# Patient Record
Sex: FEMALE | Race: WHITE | NOT HISPANIC OR LATINO | Employment: FULL TIME | URBAN - METROPOLITAN AREA
[De-identification: names, ages, dates, MRNs, and addresses within clinical notes are randomized per-mention and may not be internally consistent; named-entity substitution may affect disease eponyms.]

---

## 2019-01-31 ENCOUNTER — HOSPITAL ENCOUNTER (OUTPATIENT)
Dept: INFUSION THERAPY | Facility: HOSPITAL | Age: 29
Discharge: HOME | End: 2019-01-31
Attending: OBSTETRICS & GYNECOLOGY

## 2019-01-31 LAB
ABO GROUP BLD: NORMAL
BLD GP AB SCN SERPL QL: NORMAL
CONV ABD CONTROL: NORMAL
RH BLD: NORMAL

## 2019-04-06 ENCOUNTER — HOSPITAL ENCOUNTER (OUTPATIENT)
Dept: LABOR AND DELIVERY | Facility: HOSPITAL | Age: 29
Discharge: HOME | End: 2019-04-06
Attending: OBSTETRICS & GYNECOLOGY

## 2019-12-26 ENCOUNTER — HOSPITAL ENCOUNTER (OUTPATIENT)
Dept: URGENT CARE | Facility: CLINIC | Age: 29
Discharge: HOME | End: 2019-12-26
Attending: NURSE PRACTITIONER

## 2019-12-28 LAB — BACTERIA SPEC AEROBE CULT: NORMAL

## 2021-05-21 ENCOUNTER — HOSPITAL ENCOUNTER (OUTPATIENT)
Dept: GENERAL RADIOLOGY | Facility: HOSPITAL | Age: 31
Discharge: HOME | End: 2021-05-21
Attending: OBSTETRICS & GYNECOLOGY

## 2022-04-20 PROCEDURE — U0004 COV-19 TEST NON-CDC HGH THRU: HCPCS | Performed by: FAMILY MEDICINE

## 2022-04-22 ENCOUNTER — TELEPHONE (OUTPATIENT)
Dept: URGENT CARE | Facility: CLINIC | Age: 32
End: 2022-04-22

## 2022-04-22 ENCOUNTER — E-VISIT (OUTPATIENT)
Dept: ADMINISTRATIVE | Facility: OTHER | Age: 32
End: 2022-04-22

## 2022-04-22 ENCOUNTER — TELEMEDICINE (OUTPATIENT)
Dept: FAMILY MEDICINE CLINIC | Facility: TELEHEALTH | Age: 32
End: 2022-04-22

## 2022-04-22 DIAGNOSIS — J06.9 VIRAL UPPER RESPIRATORY TRACT INFECTION: Primary | ICD-10-CM

## 2022-04-22 PROCEDURE — 99212 OFFICE O/P EST SF 10 MIN: CPT | Performed by: NURSE PRACTITIONER

## 2022-04-22 NOTE — PROGRESS NOTES
You have chosen to receive care through a telehealth visit.  Do you consent to use a video/audio connection for your medical care today? Yes     CHIEF COMPLAINT  Chief Complaint   Patient presents with   • GI Problem         HPI  Farideh Navarro is a 31 y.o. female  presents with complaint of nausea, vomiting and diarrhea. She started having symptoms on Monday about 2 hours [place] [employer]   She then developed cough, nasal congestion and lack of appetite and change in the way food taste. She was seen at Urgent care on 4/20 with negative COVID-19 and flu testing. She was exposed to the flu by her mother several days before becoming ill.   She has not picked up her Zofran, but will have someone to pick it up for her today     Review of Systems   Constitutional: Positive for chills, diaphoresis and fatigue. Negative for fever.   HENT: Positive for congestion, postnasal drip, rhinorrhea, sneezing and sore throat.    Respiratory: Positive for cough and shortness of breath (not out of the normal for her. ). Negative for chest tightness and wheezing.    Cardiovascular: Negative for chest pain.   Gastrointestinal: Positive for diarrhea (chronic due to IBS, but it is worse. ), nausea and vomiting.   Neurological: Positive for headaches.   Hematological: Negative for adenopathy.       No past medical history on file.    No family history on file.    Social History     Socioeconomic History   • Marital status:    Tobacco Use   • Smoking status: Former Smoker   • Smokeless tobacco: Never Used   Vaping Use   • Vaping Use: Every day   • Substances: Nicotine, Flavoring   • Devices: Disposable       Farideh Navarro  reports that she has quit smoking. She has never used smokeless tobacco. No tobacco use               LMP 04/15/2022 (Approximate)     PHYSICAL EXAM  Physical Exam   Constitutional: She is oriented to person, place, and time. She appears well-developed and well-nourished. She has a sickly  appearance. She does not appear ill. No distress.   HENT:   Head: Normocephalic and atraumatic.   Neck: Neck normal appearance.  Pulmonary/Chest: Effort normal.  No respiratory distress.  Neurological: She is alert and oriented to person, place, and time.   Skin: Skin is dry.   Psychiatric: She has a normal mood and affect.         Diagnoses and all orders for this visit:    1. Viral upper respiratory tract infection (Primary)    Flu negative, but she does have symptoms and exposure.   Take Zofran for nausea.   Try Robitussin DM, Mucinex DM or generic equivalent for cough.     The use of a video visit has been reviewed with the patient and verbal informd consent has een obtained. Myself and Farideh Navarro participated in this visit. The patient is located in [address] [place] [address]. I am located in [place], [place]. Mychart and Zoom were utilized. I spent 2 minutes in the patient's chart for this visit.           Marquita Dior, CECILIO  04/22/2022  12:07 EDT

## 2022-04-22 NOTE — TELEPHONE ENCOUNTER
----- Message from CECILIO Benavides sent at 4/22/2022  9:33 AM EDT -----  Please notify patient of negative COVID result

## 2022-04-22 NOTE — E-VISIT ESCALATED
Chief Complaint: Constipation   Patient was shown the following escalation message:   Some conditions need a visit with a healthcare provider   Because you've passed stool when you didn't mean to, you should speak with a provider to get care.   ----------   Patient Interview Transcript:   Which symptoms are you having? Select all that apply.    Abdominal pain or discomfort    Bloating    Swollen or distended abdomen   Not selected:    Fewer than 3 stools per week    Hard or lumpy stools    Stools that are too small    Straining with bowel movements    Feeling as though a blockage in my rectum is preventing a bowel movement    Feeling as though I can't completely empty my bowels   Which symptom is bothering you the most? Select one.    Abdominal pain or discomfort   Not selected:    Bloating    Swollen or distended abdomen    Fewer than 3 stools per week    Hard or lumpy stools    Stools that are too small    Straining with bowel movements    Feeling as though a blockage in my rectum is preventing a bowel movement    Feeling as though I can't completely empty my bowels   How long have you had your symptoms? Select one.    Less than 1 week   Not selected:    1 to 2 weeks    2 to 4 weeks    More than 1 month but less than 3 months    More than 3 months    I'm not sure   Are your symptoms constant, or do they come and go? For example, have you had normal stools in between the times you had hard stools? Are there times when you don't need to strain to have a bowel movement? Select one.    I'm not sure   Not selected:    Constant    Come and go   When was the last time you passed any stool at all? Select one.    Today   Not selected:    Yesterday    2 days ago    3 days ago    More than 3 days ago   On average, how many times a week do you have a bowel movement? Select one.    More than 1 time per day   Not selected:    Once a week    Twice a week    3 times per week    Every other day    Once a day   How often do you  feel the need or urge to have a bowel movement? Select one.    More than once a day   Not selected:    Less than 3 times per week    Every other day    Once a day   When you feel the need, how often do you immediately try to have a bowel movement? Select one.    Always   Not selected:    More than half the time    Less than half the time    Rarely or never   When you feel the need and then try to have a bowel movement, how often are you able to pass stool? Report passing any stool, even if it's a small amount. Select one.    Always   Not selected:    More than half the time    Less than half the time    Rarely or never   When you try to have a bowel movement, how often do you feel as if you've completely emptied your bowels? Select one.    More than half the time   Not selected:    Always    Less than half the time    Rarely or never   Do you ever need help having a bowel movement, such as using a finger to remove the stool from your rectum? Select one.    No   Not selected:    Yes   How would you rate the abdominal pain? Select one.    I'm not sure   Not selected:    Mild; I only notice it when I pay attention to it    Moderate; it's uncomfortable, but I can still do regular daily tasks    Severe; I can't get comfortable, and it stops me from doing regular daily tasks   Is the abdominal pain constant, or does it come and go? Select one.    Comes and goes   Not selected:    Constant    I'm not sure   Is the abdominal pain getting better, getting worse, or staying the same? Select one.    Same   Not selected:    Better    Worse   Do any of these describe your bloating or swollen abdomen? Select all that apply.    My belly feels skinner, tighter, and firmer than usual   Not selected:    My bloating and/or swollen abdomen symptoms have been getting worse    I feel as if I need to pass gas, but I can't    None of these   Think back to another time when you had similar symptoms. Does this current episode feel similar to  past episodes? Select one.    I've never had these symptoms before   Not selected:    Yes    No   Since your symptoms began, have you passed or leaked stool when you didn't mean to? Select one.    Yes   Not selected:    No   ----------   Medical history   Medical history data does not currently exist for this patient.

## 2022-04-22 NOTE — PATIENT INSTRUCTIONS
Drink plenty of water  Over the counter pain relievers okay   If symptoms do not improve in 3-5 days follow up with your primary care provider or urgent care  If symptoms worsen follow up with urgent care or the emergency room      Upper Respiratory Infection, Adult  An upper respiratory infection (URI) is a common viral infection of the nose, throat, and upper air passages that lead to the lungs. The most common type of URI is the common cold. URIs usually get better on their own, without medical treatment.  What are the causes?  A URI is caused by a virus. You may catch a virus by:  Breathing in droplets from an infected person's cough or sneeze.  Touching something that has been exposed to the virus (contaminated) and then touching your mouth, nose, or eyes.  What increases the risk?  You are more likely to get a URI if:  You are very young or very old.  It is frankie or winter.  You have close contact with others, such as at a , school, or health care facility.  You smoke.  You have long-term (chronic) heart or lung disease.  You have a weakened disease-fighting (immune) system.  You have nasal allergies or asthma.  You are experiencing a lot of stress.  You work in an area that has poor air circulation.  You have poor nutrition.  What are the signs or symptoms?  A URI usually involves some of the following symptoms:  Runny or stuffy (congested) nose.  Sneezing.  Cough.  Sore throat.  Headache.  Fatigue.  Fever.  Loss of appetite.  Pain in your forehead, behind your eyes, and over your cheekbones (sinus pain).  Muscle aches.  Redness or irritation of the eyes.  Pressure in the ears or face.  How is this diagnosed?  This condition may be diagnosed based on your medical history and symptoms, and a physical exam. Your health care provider may use a cotton swab to take a mucus sample from your nose (nasal swab). This sample can be tested to determine what virus is causing the illness.  How is this  treated?  URIs usually get better on their own within 7-10 days. You can take steps at home to relieve your symptoms. Medicines cannot cure URIs, but your health care provider may recommend certain medicines to help relieve symptoms, such as:  Over-the-counter cold medicines.  Cough suppressants. Coughing is a type of defense against infection that helps to clear the respiratory system, so take these medicines only as recommended by your health care provider.  Fever-reducing medicines.  Follow these instructions at home:  Activity  Rest as needed.  If you have a fever, stay home from work or school until your fever is gone or until your health care provider says you are no longer contagious. Your health care provider may have you wear a face mask to prevent your infection from spreading.  Relieving symptoms  Gargle with a salt-water mixture 3-4 times a day or as needed. To make a salt-water mixture, completely dissolve ½-1 tsp of salt in 1 cup of warm water.  Use a cool-mist humidifier to add moisture to the air. This can help you breathe more easily.  Eating and drinking    Drink enough fluid to keep your urine pale yellow.  Eat soups and other clear broths.    General instructions    Take over-the-counter and prescription medicines only as told by your health care provider. These include cold medicines, fever reducers, and cough suppressants.  Do not use any products that contain nicotine or tobacco, such as cigarettes and e-cigarettes. If you need help quitting, ask your health care provider.  Stay away from secondhand smoke.  Stay up to date on all immunizations, including the yearly (annual) flu vaccine.  Keep all follow-up visits as told by your health care provider. This is important.    How to prevent the spread of infection to others    URIs can be passed from person to person (are contagious). To prevent the infection from spreading:  Wash your hands often with soap and water. If soap and water are not  "available, use hand .  Avoid touching your mouth, face, eyes, or nose.  Cough or sneeze into a tissue or your sleeve or elbow instead of into your hand or into the air.    Contact a health care provider if:  You are getting worse instead of better.  You have a fever or chills.  Your mucus is brown or red.  You have yellow or brown discharge coming from your nose.  You have pain in your face, especially when you bend forward.  You have swollen neck glands.  You have pain while swallowing.  You have white areas in the back of your throat.  Get help right away if:  You have shortness of breath that gets worse.  You have severe or persistent:  Headache.  Ear pain.  Sinus pain.  Chest pain.  You have chronic lung disease along with any of the following:  Wheezing.  Prolonged cough.  Coughing up blood.  A change in your usual mucus.  You have a stiff neck.  You have changes in your:  Vision.  Hearing.  Thinking.  Mood.  Summary  An upper respiratory infection (URI) is a common infection of the nose, throat, and upper air passages that lead to the lungs.  A URI is caused by a virus.  URIs usually get better on their own within 7-10 days.  Medicines cannot cure URIs, but your health care provider may recommend certain medicines to help relieve symptoms.  This information is not intended to replace advice given to you by your health care provider. Make sure you discuss any questions you have with your health care provider.  Document Revised: 08/26/2021 Document Reviewed: 08/26/2021  [employer] Patient Education © 2021 [employer]    Influenza, Adult  Influenza, also called \"the flu,\" is a viral infection that mainly affects the respiratory tract. This includes the lungs, nose, and throat. The flu spreads easily from person to person (is contagious). It causes common cold symptoms, along with high fever and body aches.  What are the causes?  This condition is caused by the influenza virus. You can get the virus " by:  Breathing in droplets that are in the air from an infected person's cough or sneeze.  Touching something that has the virus on it (has been contaminated) and then touching your mouth, nose, or eyes.  What increases the risk?  The following factors may make you more likely to get the flu:  Not washing or sanitizing your hands often.  Having close contact with many people during cold and flu season.  Touching your mouth, eyes, or nose without first washing or sanitizing your hands.  Not getting an annual flu shot.  You may have a higher risk for the flu, including serious problems, such as a lung infection (pneumonia), if you:  Are older than 65.  Are pregnant.  Have a weakened disease-fighting system (immune system). This includes people who have HIV or AIDS, are on chemotherapy, or are taking medicines that reduce (suppress) the immune system.  Have a long-term (chronic) illness, such as heart disease, kidney disease, diabetes, or lung disease.  Have a liver disorder.  Are severely overweight (morbidly obese).  Have anemia.  Have asthma.  What are the signs or symptoms?  Symptoms of this condition usually begin suddenly and last 4-14 days. These may include:  Fever and chills.  Headaches, body aches, or muscle aches.  Sore throat.  Cough.  Runny or stuffy (congested) nose.  Chest discomfort.  Poor appetite.  Weakness or fatigue.  Dizziness.  Nausea or vomiting.  How is this diagnosed?  This condition may be diagnosed based on:  Your symptoms and medical history.  A physical exam.  Swabbing your nose or throat and testing the fluid for the influenza virus.  How is this treated?  If the flu is diagnosed early, you can be treated with antiviral medicine that is given by mouth (orally) or through an IV. This can help reduce how severe the illness is and how long it lasts.  Taking care of yourself at home can help relieve symptoms. Your health care provider may recommend:  Taking over-the-counter  medicines.  Drinking plenty of fluids.  In many cases, the flu goes away on its own. If you have severe symptoms or complications, you may be treated in a hospital.  Follow these instructions at home:  Activity  Rest as needed and get plenty of sleep.  Stay home from work or school as told by your health care provider. Unless you are visiting your health care provider, avoid leaving home until your fever has been gone for 24 hours without taking medicine.  Eating and drinking  Take an oral rehydration solution (ORS). This is a drink that is sold at pharmacies and retail stores.  Drink enough fluid to keep your urine pale yellow.  Drink clear fluids in small amounts as you are able. Clear fluids include water, ice chips, fruit juice mixed with water, and low-calorie sports drinks.  Eat bland, easy-to-digest foods in small amounts as you are able. These foods include bananas, applesauce, rice, lean meats, toast, and crackers.  Avoid drinking fluids that contain a lot of sugar or caffeine, such as energy drinks, regular sports drinks, and soda.  Avoid alcohol.  Avoid spicy or fatty foods.  General instructions         Take over-the-counter and prescription medicines only as told by your health care provider.  Use a cool mist humidifier to add humidity to the air in your home. This can make it easier to breathe.  When using a cool mist humidifier, clean it daily. Empty the water and replace it with clean water.  Cover your mouth and nose when you cough or sneeze.  Wash your hands with soap and water often and for at least 20 seconds, especially after you cough or sneeze. If soap and water are not available, use alcohol-based hand .  Keep all follow-up visits. This is important.  How is this prevented?    Get an annual flu shot. This is usually available in late summer, fall, or winter. Ask your health care provider when you should get your flu shot.  Avoid contact with people who are sick during cold and flu  "season. This is generally fall and winter.  Contact a health care provider if:  You develop new symptoms.  You have:  Chest pain.  Diarrhea.  A fever.  Your cough gets worse.  You produce more mucus.  You feel nauseous or you vomit.  Get help right away if you:  Develop shortness of breath or have difficulty breathing.  Have skin or nails that turn a bluish color.  Have severe pain or stiffness in your neck.  Develop a sudden headache or sudden pain in your face or ear.  Cannot eat or drink without vomiting.  These symptoms may represent a serious problem that is an emergency. Do not wait to see if the symptoms will go away. Get medical help right away. Call your local emergency services (911 in the [place] Do not drive yourself to the hospital.  Summary  Influenza, also called \"the flu,\" is a viral infection that primarily affects your respiratory tract.  Symptoms of the flu usually begin suddenly and last 4-14 days.  Getting an annual flu shot is the best way to prevent getting the flu.  Stay home from work or school as told by your health care provider. Unless you are visiting your health care provider, avoid leaving home until your fever has been gone for 24 hours without taking medicine.  Keep all follow-up visits. This is important.  This information is not intended to replace advice given to you by your health care provider. Make sure you discuss any questions you have with your health care provider.  Document Revised: 08/06/2021 Document Reviewed: 08/06/2021  [employer] Patient Education © 2021 [employer]            "

## 2022-08-19 PROCEDURE — U0004 COV-19 TEST NON-CDC HGH THRU: HCPCS | Performed by: NURSE PRACTITIONER

## 2022-10-21 ENCOUNTER — TELEPHONE (OUTPATIENT)
Dept: INTERNAL MEDICINE | Facility: CLINIC | Age: 32
End: 2022-10-21

## 2022-10-21 ENCOUNTER — OFFICE VISIT (OUTPATIENT)
Dept: INTERNAL MEDICINE | Facility: CLINIC | Age: 32
End: 2022-10-21

## 2022-10-21 VITALS
WEIGHT: 174.2 LBS | BODY MASS INDEX: 30.87 KG/M2 | DIASTOLIC BLOOD PRESSURE: 68 MMHG | HEIGHT: 63 IN | SYSTOLIC BLOOD PRESSURE: 101 MMHG | OXYGEN SATURATION: 96 % | HEART RATE: 80 BPM | TEMPERATURE: 99.6 F

## 2022-10-21 DIAGNOSIS — K58.0 IRRITABLE BOWEL SYNDROME WITH DIARRHEA: ICD-10-CM

## 2022-10-21 DIAGNOSIS — J30.9 ALLERGIC RHINITIS, UNSPECIFIED SEASONALITY, UNSPECIFIED TRIGGER: ICD-10-CM

## 2022-10-21 DIAGNOSIS — Z13.220 SCREENING FOR LIPID DISORDERS: ICD-10-CM

## 2022-10-21 DIAGNOSIS — Z00.00 ANNUAL PHYSICAL EXAM: ICD-10-CM

## 2022-10-21 DIAGNOSIS — Z83.79 FAMILY HISTORY OF CROHN'S DISEASE: ICD-10-CM

## 2022-10-21 DIAGNOSIS — Z00.00 ENCOUNTER FOR MEDICAL EXAMINATION TO ESTABLISH CARE: Primary | ICD-10-CM

## 2022-10-21 DIAGNOSIS — Z11.59 NEED FOR HEPATITIS C SCREENING TEST: ICD-10-CM

## 2022-10-21 PROCEDURE — 99395 PREV VISIT EST AGE 18-39: CPT | Performed by: NURSE PRACTITIONER

## 2022-10-21 PROCEDURE — 80050 GENERAL HEALTH PANEL: CPT | Performed by: NURSE PRACTITIONER

## 2022-10-21 PROCEDURE — 99213 OFFICE O/P EST LOW 20 MIN: CPT | Performed by: NURSE PRACTITIONER

## 2022-10-21 PROCEDURE — 86803 HEPATITIS C AB TEST: CPT | Performed by: NURSE PRACTITIONER

## 2022-10-21 PROCEDURE — 80061 LIPID PANEL: CPT | Performed by: NURSE PRACTITIONER

## 2022-10-21 RX ORDER — CETIRIZINE HYDROCHLORIDE 10 MG/1
10 TABLET ORAL DAILY
Qty: 90 TABLET | Refills: 1 | Status: SHIPPED | OUTPATIENT
Start: 2022-10-21

## 2022-10-21 NOTE — TELEPHONE ENCOUNTER
1ST 21 OCT 2022 FAXED MEDICAL RECORDS REQUEST TO [facility] [phone]  ----- Message from CECILIO Tirado sent at 10/21/2022  2:13 PM EDT -----  Last PAP from [facility]

## 2022-10-21 NOTE — PROGRESS NOTES
Chief Complaint  Establish Care, Eye Problem (Left eye swelling after having covid. Clear liquid coming out of eye.), and Abdominal Cramping (Woke this morning with some strong cramp, hurts more while sitting still for long period of times. )    Subjective          Farideh Navarro presents to [facility]  Diarrhea   This is a recurrent problem. The current episode started more than 1 year ago. The problem has been unchanged. The patient states that diarrhea does not awaken her from sleep. Associated symptoms include abdominal pain (intermittent cramping ). Pertinent negatives include no arthralgias, bloating, chills, coughing, fever, headaches, increased  flatus, myalgias, sweats, URI, vomiting or weight loss. Risk factors: Family hx of Crohns  She has tried change of diet for the symptoms. The treatment provided no relief. was supposed to have colonoscopy at [facility]    Nicotine Dependence  Presents for initial visit. Symptoms include cravings. Symptoms are negative for decreased concentration, fatigue, headache, insomnia, irritability and sore throat. Preferred tobacco types include cigarettes. She smokes < 1/2 a pack of cigarettes per day. She started smoking when she was <11 years old.       Previous PCP:  Never had one  Specialist(s): [facility] chiropractor   COVID vaccine:completed other than booster  Pneumonia vaccine: no  Shingles vaccine: no  Colon cancer screening:   Mammogram: Earlier this year.  Pap Smear: about 6 months ago.     Scoliosis - sees chiropractor.   [facility].     Used to binge drink.   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   Daily:   Smoked weed daily    Drinks a lot of caffiene.   Smokes <1/2 ppd.   vapes at night   Does not wan to quit.       Current Outpatient Medications   Medication Instructions   • cetirizine (ZYRTEC) 10 mg, Oral, Daily       The following portions of the patient's history were reviewed and updated as appropriate:  "allergies, current medications, past family history, past medical history, past social history, past surgical history, and problem list.    Objective   Vital Signs:   /68   Pulse 80   Temp 99.6 °F (37.6 °C) (Temporal)   Ht 160 cm (63\")   Wt 79 kg (174 lb 3.2 oz)   SpO2 96%   BMI 30.86 kg/m²     Wt Readings from Last 3 Encounters:   10/21/22 79 kg (174 lb 3.2 oz)   08/19/22 78 kg (172 lb)   08/01/22 80.3 kg (177 lb)     BP Readings from Last 3 Encounters:   10/21/22 101/68   08/19/22 110/77   08/01/22 108/75     Physical Exam   Appearance: No acute distress, well-nourished  Head: normocephalic, atraumatic  Eyes: extraocular movements intact, no scleral icterus, no conjunctival injection  Ears, Nose, and Throat: external ears normal, nares patent, moist mucous membranes  Cardiovascular: regular rate and rhythm. no murmurs, rubs, or gallops. no edema  Respiratory: breathing comfortably, symmetric chest rise, clear to auscultation bilaterally. No wheezes, rales, or rhonchi.  Neuro: alert and oriented to time, place, and person. Normal gait  Psych: normal mood and affect     Result Review :   The following data was reviewed by: CECILIO Tirado on 10/21/2022:  Common labs    Common Labs 10/21/22 10/21/22 10/21/22    1614 1614 1614   Glucose   86   BUN   12   Creatinine   0.74   Sodium   138   Potassium   4.2   Chloride   103   Calcium   9.6   Albumin   4.30   Total Bilirubin   0.3   Alkaline Phosphatase   74   AST (SGOT)   14   ALT (SGPT)   18   WBC 5.96     Hemoglobin 13.2     Hematocrit 40.1     Platelets 276     Total Cholesterol  150    Triglycerides  120    HDL Cholesterol  31 (A)    LDL Cholesterol   97    (A) Abnormal value                   Lab Results   Component Value Date    SARSANTIGEN Not Detected 08/19/2022    COVID19 Not Detected 08/19/2022    RAPFLUA Negative 08/19/2022    RAPFLUB Negative 08/19/2022    RAPSCRN Negative 08/19/2022       Procedures        Assessment and Plan  "   Diagnoses and all orders for this visit:    1. Encounter for medical examination to establish care (Primary)    2. Family history of Crohn's disease  -     Ambulatory Referral to Gastroenterology    3. Irritable bowel syndrome with diarrhea  -     Ambulatory Referral to Gastroenterology  -     TSH Rfx On Abnormal To Free T4  -     Comprehensive Metabolic Panel  -     CBC & Differential    4. Allergic rhinitis, unspecified seasonality, unspecified trigger  -     cetirizine (zyrTEC) 10 MG tablet; Take 1 tablet by mouth Daily.  Dispense: 90 tablet; Refill: 1    5. Annual physical exam    6. Need for hepatitis C screening test  -     HCV Antibody Rfx To Qnt PCR  -     Interpretation:    7. Screening for lipid disorders  -     Lipid Panel        Advised on diet, physical activity, sunscreen, helmet, texting and driving, etc    There are no discontinued medications.       Follow Up   Return in about 1 year (around 10/21/2023) for Annual physical.  Patient was given instructions and counseling regarding her condition or for health maintenance advice. Please see specific information pulled into the AVS if appropriate.       Vianca Mercado, CECILIO  10/25/22  08:40 EDT

## 2022-10-22 LAB
ALBUMIN SERPL-MCNC: 4.3 G/DL (ref 3.5–5.2)
ALBUMIN/GLOB SERPL: 1.7 G/DL
ALP SERPL-CCNC: 74 U/L (ref 39–117)
ALT SERPL W P-5'-P-CCNC: 18 U/L (ref 1–33)
ANION GAP SERPL CALCULATED.3IONS-SCNC: 10.1 MMOL/L (ref 5–15)
AST SERPL-CCNC: 14 U/L (ref 1–32)
BASOPHILS # BLD AUTO: 0.03 10*3/MM3 (ref 0–0.2)
BASOPHILS NFR BLD AUTO: 0.5 % (ref 0–1.5)
BILIRUB SERPL-MCNC: 0.3 MG/DL (ref 0–1.2)
BUN SERPL-MCNC: 12 MG/DL (ref 6–20)
BUN/CREAT SERPL: 16.2 (ref 7–25)
CALCIUM SPEC-SCNC: 9.6 MG/DL (ref 8.6–10.5)
CHLORIDE SERPL-SCNC: 103 MMOL/L (ref 98–107)
CHOLEST SERPL-MCNC: 150 MG/DL (ref 0–200)
CO2 SERPL-SCNC: 24.9 MMOL/L (ref 22–29)
CREAT SERPL-MCNC: 0.74 MG/DL (ref 0.57–1)
DEPRECATED RDW RBC AUTO: 40.5 FL (ref 37–54)
EGFRCR SERPLBLD CKD-EPI 2021: 110.4 ML/MIN/1.73
EOSINOPHIL # BLD AUTO: 0.13 10*3/MM3 (ref 0–0.4)
EOSINOPHIL NFR BLD AUTO: 2.2 % (ref 0.3–6.2)
ERYTHROCYTE [DISTWIDTH] IN BLOOD BY AUTOMATED COUNT: 11.9 % (ref 12.3–15.4)
GLOBULIN UR ELPH-MCNC: 2.5 GM/DL
GLUCOSE SERPL-MCNC: 86 MG/DL (ref 65–99)
HCT VFR BLD AUTO: 40.1 % (ref 34–46.6)
HDLC SERPL-MCNC: 31 MG/DL (ref 40–60)
HGB BLD-MCNC: 13.2 G/DL (ref 12–15.9)
IMM GRANULOCYTES # BLD AUTO: 0.01 10*3/MM3 (ref 0–0.05)
IMM GRANULOCYTES NFR BLD AUTO: 0.2 % (ref 0–0.5)
LDLC SERPL CALC-MCNC: 97 MG/DL (ref 0–100)
LDLC/HDLC SERPL: 3.06 {RATIO}
LYMPHOCYTES # BLD AUTO: 2.25 10*3/MM3 (ref 0.7–3.1)
LYMPHOCYTES NFR BLD AUTO: 37.8 % (ref 19.6–45.3)
MCH RBC QN AUTO: 30.4 PG (ref 26.6–33)
MCHC RBC AUTO-ENTMCNC: 32.9 G/DL (ref 31.5–35.7)
MCV RBC AUTO: 92.4 FL (ref 79–97)
MONOCYTES # BLD AUTO: 0.43 10*3/MM3 (ref 0.1–0.9)
MONOCYTES NFR BLD AUTO: 7.2 % (ref 5–12)
NEUTROPHILS NFR BLD AUTO: 3.11 10*3/MM3 (ref 1.7–7)
NEUTROPHILS NFR BLD AUTO: 52.1 % (ref 42.7–76)
NRBC BLD AUTO-RTO: 0 /100 WBC (ref 0–0.2)
PLATELET # BLD AUTO: 276 10*3/MM3 (ref 140–450)
PMV BLD AUTO: 12.2 FL (ref 6–12)
POTASSIUM SERPL-SCNC: 4.2 MMOL/L (ref 3.5–5.2)
PROT SERPL-MCNC: 6.8 G/DL (ref 6–8.5)
RBC # BLD AUTO: 4.34 10*6/MM3 (ref 3.77–5.28)
SODIUM SERPL-SCNC: 138 MMOL/L (ref 136–145)
TRIGL SERPL-MCNC: 120 MG/DL (ref 0–150)
TSH SERPL DL<=0.05 MIU/L-ACNC: 1.85 UIU/ML (ref 0.27–4.2)
VLDLC SERPL-MCNC: 22 MG/DL (ref 5–40)
WBC NRBC COR # BLD: 5.96 10*3/MM3 (ref 3.4–10.8)

## 2022-10-23 LAB
HCV AB S/CO SERPL IA: <0.1 S/CO RATIO (ref 0–0.9)
HCV AB SERPL QL IA: NORMAL

## 2023-08-30 NOTE — PROGRESS NOTES
Chief Complaint        IBS w/diarrhea    History of Present Illness      Farideh Navarro is a 33 y.o. female who presents to [facility] as a new patient with a history of generalized abdominal pain, altered bowel habits, diarrhea, IBS with diarrhea, family history of Crohn's disease and nausea.  Patient reports that she has had abdominal symptoms since being a teenager.  She has intermittent abdominal pain that begins with cramping in the central abdomen and then radiates to the lower abdomen.  Patient reports bowel movements that range from anywhere from 3-10 times per day that are watery and loose in texture.  She denies any melena or hematochezia.  Patient reports that she has been trying to eat very clean which causes more frequent bowel movements but less pain.  She does have a family history of Crohn's disease and a paternal grandfather.  She reports very urgent bowel movements.  Patient reports nausea that is intermittent.  She denies vomiting.  Patient reports skipped meals worsen her symptoms as well as after meals seems to cause worsening symptoms.  Dairy and gluten seem to be triggers.  Patient denies fever,  vomiting, weight loss, night sweats, melena, hematochezia, hematemesis.    No colon or EGD on file for this patient      Results       Result Review :   The following data was reviewed by: CECILIO Dowling on 09/01/2023     CMP          10/21/2022    16:14   CMP   Glucose 86    BUN 12    Creatinine 0.74    EGFR 110.4    Sodium 138    Potassium 4.2    Chloride 103    Calcium 9.6    Total Protein 6.8    Albumin 4.30    Globulin 2.5    Total Bilirubin 0.3    Alkaline Phosphatase 74    AST (SGOT) 14    ALT (SGPT) 18    Albumin/Globulin Ratio 1.7    BUN/Creatinine Ratio 16.2    Anion Gap 10.1      CBC          10/21/2022    16:14   CBC   WBC 5.96    RBC 4.34    Hemoglobin 13.2    Hematocrit 40.1    MCV 92.4    MCH 30.4    MCHC 32.9    RDW 11.9    Platelets 276   "      Iron Profile No results found for: IRON, TIBC, LABIRON, TRANSFERRIN  Ferritin No results found for: FERRITIN         Past Medical History       Past Medical History:   Diagnosis Date    Gestational diabetes        Past Surgical History:   Procedure Laterality Date    APPENDECTOMY       SECTION      TUBAL ABDOMINAL LIGATION           Current Outpatient Medications:     dicyclomine (BENTYL) 20 MG tablet, Take 1 tablet by mouth Every 6 (Six) Hours., Disp: 90 tablet, Rfl: 3    Sod Picosulfate-Mag Ox-Cit Acd (Clenpiq) 10-3.5-12 MG-GM -GM/160ML solution, Take 175 mL by mouth 1 (One) Time for 1 dose. As directed by office., Disp: 350 mL, Rfl: 0     Allergies   Allergen Reactions    Aloe Hives    Lamotrigine Hives       Family History   Problem Relation Age of Onset    Hypertension Mother     Fibromyalgia Mother     Anxiety disorder Mother     Diabetes Father     Deep vein thrombosis Father     Kidney failure Father     Hypertension Maternal Grandmother     Skin cancer Maternal Grandmother     Hypertension Maternal Grandfather     Diabetes Maternal Grandfather     Crohn's disease Maternal Grandfather     COPD Paternal Grandmother     Heart attack Paternal Grandmother     Fibromyalgia Paternal Grandmother     Colon cancer Neg Hx         Social History     Social History Narrative    Not on file       Objective       Objective     Vital Signs:   BP 98/65 (BP Location: Left arm, Patient Position: Sitting, Cuff Size: Adult)   Pulse 80   Ht 160 cm (63\")   Wt 81.6 kg (179 lb 12.8 oz)   SpO2 97%   BMI 31.85 kg/mý     Body mass index is 31.85 kg/mý.    Physical Exam  Constitutional:       General: She is not in acute distress.     Appearance: Normal appearance. She is well-developed and normal weight.   Eyes:      Conjunctiva/sclera: Conjunctivae normal.      Pupils: Pupils are equal, round, and reactive to light.      Visual Fields: Right eye visual fields normal and left eye visual fields normal. "   Cardiovascular:      Rate and Rhythm: Normal rate and regular rhythm.      Heart sounds: Normal heart sounds.   Pulmonary:      Effort: Pulmonary effort is normal. No retractions.      Breath sounds: Normal breath sounds and air entry.      Comments: Inspection of chest: normal appearance  Abdominal:      General: Bowel sounds are normal.      Palpations: Abdomen is soft.      Tenderness: There is no abdominal tenderness in the right lower quadrant, suprapubic area and left lower quadrant.      Comments: No appreciable hepatosplenomegaly   Musculoskeletal:      Cervical back: Neck supple.      Right lower leg: No edema.      Left lower leg: No edema.   Lymphadenopathy:      Cervical: No cervical adenopathy.   Skin:     Findings: No lesion.      Comments: Turgor normal   Neurological:      Mental Status: She is alert and oriented to person, place, and time.   Psychiatric:         Mood and Affect: Mood and affect normal.            Assessment & Plan          Assessment and Plan    Diagnoses and all orders for this visit:    1. Nausea (Primary)  -     Case Request; Standing  -     Follow Anesthesia Guidelines / Protocol; Standing  -     Obtain Informed Consent; Standing  -     Verify NPO; Standing  -     Verify Bowel Prep Was Successful; Standing  -     Give Tap Water Enema If Bowel Prep Insufficient; Standing  -     Case Request    2. Family history of Crohn's disease  -     Case Request; Standing  -     Follow Anesthesia Guidelines / Protocol; Standing  -     Obtain Informed Consent; Standing  -     Verify NPO; Standing  -     Verify Bowel Prep Was Successful; Standing  -     Give Tap Water Enema If Bowel Prep Insufficient; Standing  -     Case Request    3. Generalized abdominal pain  -     Case Request; Standing  -     Follow Anesthesia Guidelines / Protocol; Standing  -     Obtain Informed Consent; Standing  -     Verify NPO; Standing  -     Verify Bowel Prep Was Successful; Standing  -     Give Tap Water Enema  If Bowel Prep Insufficient; Standing  -     Case Request    4. Altered bowel habits  -     Case Request; Standing  -     Follow Anesthesia Guidelines / Protocol; Standing  -     Obtain Informed Consent; Standing  -     Verify NPO; Standing  -     Verify Bowel Prep Was Successful; Standing  -     Give Tap Water Enema If Bowel Prep Insufficient; Standing  -     Case Request    5. Diarrhea, unspecified type  -     Case Request; Standing  -     Follow Anesthesia Guidelines / Protocol; Standing  -     Obtain Informed Consent; Standing  -     Verify NPO; Standing  -     Verify Bowel Prep Was Successful; Standing  -     Give Tap Water Enema If Bowel Prep Insufficient; Standing  -     Case Request    6. Irritable bowel syndrome with diarrhea  -     Case Request; Standing  -     Follow Anesthesia Guidelines / Protocol; Standing  -     Obtain Informed Consent; Standing  -     Verify NPO; Standing  -     Verify Bowel Prep Was Successful; Standing  -     Give Tap Water Enema If Bowel Prep Insufficient; Standing  -     Case Request    Other orders  -     dicyclomine (BENTYL) 20 MG tablet; Take 1 tablet by mouth Every 6 (Six) Hours.  Dispense: 90 tablet; Refill: 3  -     Sod Picosulfate-Mag Ox-Cit Acd (Clenpiq) 10-3.5-12 MG-GM -GM/160ML solution; Take 175 mL by mouth 1 (One) Time for 1 dose. As directed by office.  Dispense: 350 mL; Refill: 0      33-year-old female presenting the office today as a new patient with a history of generalized abdominal pain, altered bowel habits, diarrhea, IBS with diarrhea, family history of Crohn's disease and nausea.  Plan:  I have recommended that the patient undergo further evaluation with a colonoscopy.  I have discussed this procedure in detail with the patient.  I have discussed the risks, benefits and alternatives.  I have discussed the risk of anesthesia, bleeding and perforation.  Patient understands these risks, benefits and alternatives and wishes to proceed.  I will schedule her at her  earliest convenience.  I have prescribed Bentyl to be used as needed for abdominal cramps and spasms.  Patient will follow-up in office after endoscopy.  Patient agreeable to this plan and will call with any questions or concerns.            Follow Up       Follow Up   Return for Follow up after endoscopy in office.  Patient was given instructions and counseling regarding her condition or for health maintenance advice. Please see specific information pulled into the AVS if appropriate.

## 2023-09-01 ENCOUNTER — OFFICE VISIT (OUTPATIENT)
Dept: GASTROENTEROLOGY | Facility: CLINIC | Age: 33
End: 2023-09-01
Payer: COMMERCIAL

## 2023-09-01 VITALS
DIASTOLIC BLOOD PRESSURE: 65 MMHG | WEIGHT: 179.8 LBS | HEART RATE: 80 BPM | BODY MASS INDEX: 31.86 KG/M2 | SYSTOLIC BLOOD PRESSURE: 98 MMHG | HEIGHT: 63 IN | OXYGEN SATURATION: 97 %

## 2023-09-01 DIAGNOSIS — R11.0 NAUSEA: Primary | ICD-10-CM

## 2023-09-01 DIAGNOSIS — R19.4 ALTERED BOWEL HABITS: ICD-10-CM

## 2023-09-01 DIAGNOSIS — K58.0 IRRITABLE BOWEL SYNDROME WITH DIARRHEA: ICD-10-CM

## 2023-09-01 DIAGNOSIS — R10.84 GENERALIZED ABDOMINAL PAIN: ICD-10-CM

## 2023-09-01 DIAGNOSIS — Z83.79 FAMILY HISTORY OF CROHN'S DISEASE: ICD-10-CM

## 2023-09-01 DIAGNOSIS — R19.7 DIARRHEA, UNSPECIFIED TYPE: ICD-10-CM

## 2023-09-01 RX ORDER — SODIUM PICOSULFATE, MAGNESIUM OXIDE, AND ANHYDROUS CITRIC ACID 10; 3.5; 12 MG/160ML; G/160ML; G/160ML
175 LIQUID ORAL ONCE
Qty: 350 ML | Refills: 0 | Status: SHIPPED | OUTPATIENT
Start: 2023-09-01 | End: 2023-09-01

## 2023-09-01 RX ORDER — DICYCLOMINE HCL 20 MG
20 TABLET ORAL EVERY 6 HOURS
Qty: 90 TABLET | Refills: 3 | Status: SHIPPED | OUTPATIENT
Start: 2023-09-01

## 2023-09-05 ENCOUNTER — PATIENT ROUNDING (BHMG ONLY) (OUTPATIENT)
Dept: GASTROENTEROLOGY | Facility: CLINIC | Age: 33
End: 2023-09-05
Payer: COMMERCIAL

## 2023-09-05 NOTE — PROGRESS NOTES
9/5/2023      Hello, may I speak with Farideh Navarro     My name is Uday. I am calling from [facility] [place]. I show that you had a recent visit with CECILIO Dowling.    Before we get started may I verify your date of birth? 1990    I am calling to officially welcome you to our practice and ask about your recent visit. Is this a good time to talk? No. I left patient a voicemail, OK per SHERRIE.     Tell me about your visit with us. What things went well?    We strive to ensure that we protect your safety and privacy. Is there anything we could have done to improve this during your visit?        We're always looking for ways to make our patients' experiences even better. Do you have recommendations on ways we may improve?    Overall were you satisfied with your first visit to our practice?    I appreciate you taking the time to speak with me today. Is there anything else I can do for you?    I am glad to hear that you had a very good visit and I appreciate you taking the time to provide feedback on this call. We would greatly appreciate you filling out a survey if you receive one in the mail, email or text. This is a great opportunity to provide any additional feedback that you may think of after this call as well.       Thank you, and have a great day.

## 2023-09-29 NOTE — PRE-PROCEDURE INSTRUCTIONS
"Instructed on date and arrival time of 0700. Come to entrance \"C\". Must have  over age 18 to drive home.  May have two visitors; however, children under 12 must stay in waiting room.  Discussed clear liquid diet (no red or purple) and bowel prep.  May take medications as usual except for blood thinners, diabetic medications, and weight loss medications.  Bring list of medications.  Verbalized understanding of instructions given.  Instructed to call for questions or concerns.  "

## 2023-10-05 ENCOUNTER — ANESTHESIA EVENT (OUTPATIENT)
Dept: GASTROENTEROLOGY | Facility: HOSPITAL | Age: 33
End: 2023-10-05
Payer: COMMERCIAL

## 2023-10-05 NOTE — ANESTHESIA PREPROCEDURE EVALUATION
Anesthesia Evaluation     NPO Solid Status: > 8 hours  NPO Liquid Status: > 2 hours           Airway   Mallampati: II  TM distance: >3 FB  Neck ROM: full  No difficulty expected  Dental    (+) poor dentition        Pulmonary - normal exam   (+) a smoker (vapes) Current Smoked day of surgery,  Cardiovascular - normal exam        Neuro/Psych  GI/Hepatic/Renal/Endo    (+) obesity, diabetes mellitus gestational    Musculoskeletal (-) negative ROS    Abdominal   (+) obese   Substance History      OB/GYN negative ob/gyn ROS         Other - negative ROS                     Anesthesia Plan    ASA 1     general     (Total IV Anesthesia    Patient understands anesthesia not responsible for dental damage.  )  intravenous induction     Anesthetic plan, risks, benefits, and alternatives have been provided, discussed and informed consent has been obtained with: patient.    Plan discussed with CRNA.    CODE STATUS:

## 2023-10-06 ENCOUNTER — HOSPITAL ENCOUNTER (OUTPATIENT)
Facility: HOSPITAL | Age: 33
Setting detail: HOSPITAL OUTPATIENT SURGERY
Discharge: HOME | End: 2023-10-06
Attending: INTERNAL MEDICINE | Admitting: INTERNAL MEDICINE
Payer: COMMERCIAL

## 2023-10-06 ENCOUNTER — ANESTHESIA (OUTPATIENT)
Dept: GASTROENTEROLOGY | Facility: HOSPITAL | Age: 33
End: 2023-10-06
Payer: COMMERCIAL

## 2023-10-06 VITALS
SYSTOLIC BLOOD PRESSURE: 88 MMHG | BODY MASS INDEX: 31.91 KG/M2 | RESPIRATION RATE: 15 BRPM | WEIGHT: 180.12 LBS | DIASTOLIC BLOOD PRESSURE: 69 MMHG | HEART RATE: 61 BPM | TEMPERATURE: 98.7 F | OXYGEN SATURATION: 100 %

## 2023-10-06 DIAGNOSIS — R19.7 DIARRHEA, UNSPECIFIED TYPE: ICD-10-CM

## 2023-10-06 DIAGNOSIS — Z83.79 FAMILY HISTORY OF CROHN'S DISEASE: ICD-10-CM

## 2023-10-06 DIAGNOSIS — R11.0 NAUSEA: ICD-10-CM

## 2023-10-06 DIAGNOSIS — K58.0 IRRITABLE BOWEL SYNDROME WITH DIARRHEA: ICD-10-CM

## 2023-10-06 DIAGNOSIS — R10.84 GENERALIZED ABDOMINAL PAIN: ICD-10-CM

## 2023-10-06 DIAGNOSIS — R19.4 ALTERED BOWEL HABITS: ICD-10-CM

## 2023-10-06 PROCEDURE — 88305 TISSUE EXAM BY PATHOLOGIST: CPT | Performed by: INTERNAL MEDICINE

## 2023-10-06 PROCEDURE — 25810000003 LACTATED RINGERS PER 1000 ML: Performed by: NURSE ANESTHETIST, CERTIFIED REGISTERED

## 2023-10-06 PROCEDURE — 45385 COLONOSCOPY W/LESION REMOVAL: CPT | Performed by: INTERNAL MEDICINE

## 2023-10-06 PROCEDURE — 45380 COLONOSCOPY AND BIOPSY: CPT | Performed by: INTERNAL MEDICINE

## 2023-10-06 PROCEDURE — 25010000002 PROPOFOL 10 MG/ML EMULSION: Performed by: NURSE ANESTHETIST, CERTIFIED REGISTERED

## 2023-10-06 RX ORDER — PROPOFOL 10 MG/ML
VIAL (ML) INTRAVENOUS AS NEEDED
Status: DISCONTINUED | OUTPATIENT
Start: 2023-10-06 | End: 2023-10-06 | Stop reason: SURG

## 2023-10-06 RX ORDER — SODIUM CHLORIDE, SODIUM LACTATE, POTASSIUM CHLORIDE, CALCIUM CHLORIDE 600; 310; 30; 20 MG/100ML; MG/100ML; MG/100ML; MG/100ML
30 INJECTION, SOLUTION INTRAVENOUS CONTINUOUS
Status: DISCONTINUED | OUTPATIENT
Start: 2023-10-06 | End: 2023-10-06 | Stop reason: HOSPADM

## 2023-10-06 RX ORDER — LIDOCAINE HYDROCHLORIDE 20 MG/ML
INJECTION, SOLUTION EPIDURAL; INFILTRATION; INTRACAUDAL; PERINEURAL AS NEEDED
Status: DISCONTINUED | OUTPATIENT
Start: 2023-10-06 | End: 2023-10-06 | Stop reason: SURG

## 2023-10-06 RX ADMIN — SODIUM CHLORIDE, POTASSIUM CHLORIDE, SODIUM LACTATE AND CALCIUM CHLORIDE 30 ML/HR: 600; 310; 30; 20 INJECTION, SOLUTION INTRAVENOUS at 07:27

## 2023-10-06 RX ADMIN — PROPOFOL 100 MG: 10 INJECTION, EMULSION INTRAVENOUS at 08:10

## 2023-10-06 RX ADMIN — PROPOFOL 100 MG: 10 INJECTION, EMULSION INTRAVENOUS at 08:20

## 2023-10-06 RX ADMIN — PROPOFOL 200 MCG/KG/MIN: 10 INJECTION, EMULSION INTRAVENOUS at 08:10

## 2023-10-06 RX ADMIN — LIDOCAINE HYDROCHLORIDE 50 MG: 20 INJECTION, SOLUTION EPIDURAL; INFILTRATION; INTRACAUDAL; PERINEURAL at 08:10

## 2023-10-06 NOTE — ANESTHESIA POSTPROCEDURE EVALUATION
Patient: Farideh Navarro    Procedure Summary       Date: 10/06/23 Room / Location: [facility] 2 / [facility]    Anesthesia Start: 0809 Anesthesia Stop: 0839    Procedure: COLONOSCOPY WITH BIOPSIES/POLYPECTOMY/SNARE Diagnosis:       Nausea      Family history of Crohn's disease      Generalized abdominal pain      Altered bowel habits      Diarrhea, unspecified type      Irritable bowel syndrome with diarrhea      (Nausea [R11.0])      (Family history of Crohn's disease [Z83.79])      (Generalized abdominal pain [R10.84])      (Altered bowel habits [R19.4])      (Diarrhea, unspecified type [R19.7])      (Irritable bowel syndrome with diarrhea [K58.0])    Surgeons: Connor Borjas MD Provider: Radha Burns CRNA    Anesthesia Type: general ASA Status: 1            Anesthesia Type: general    Vitals  Vitals Value Taken Time   BP 88/69 10/06/23 0850   Temp 37.1 °C (98.7 °F) 10/06/23 0850   Pulse 80 10/06/23 0851   Resp 15 10/06/23 0850   SpO2 100 % 10/06/23 0851   Vitals shown include unvalidated device data.        Post Anesthesia Care and Evaluation    Patient location during evaluation: bedside  Patient participation: complete - patient participated  Level of consciousness: awake  Pain management: adequate    Airway patency: patent  Anesthetic complications: No anesthetic complications  PONV Status: controlled  Cardiovascular status: acceptable and stable  Respiratory status: acceptable

## 2023-10-06 NOTE — H&P
Pre Procedure History & Physical    Chief Complaint:   Diarrhea  IBS    Subjective     HPI:   As above    Past Medical History:   Past Medical History:   Diagnosis Date    Gestational diabetes     PONV (postoperative nausea and vomiting)        Past Surgical History:  Past Surgical History:   Procedure Laterality Date    APPENDECTOMY       SECTION      TUBAL ABDOMINAL LIGATION         Family History:  Family History   Problem Relation Age of Onset    Hypertension Mother     Fibromyalgia Mother     Anxiety disorder Mother     Diabetes Father     Deep vein thrombosis Father     Kidney failure Father     Hypertension Maternal Grandmother     Skin cancer Maternal Grandmother     Hypertension Maternal Grandfather     Diabetes Maternal Grandfather     Crohn's disease Maternal Grandfather     COPD Paternal Grandmother     Heart attack Paternal Grandmother     Fibromyalgia Paternal Grandmother     Colon cancer Neg Hx        Social History:   reports that she quit smoking about 2 months ago. Her smoking use included cigarettes. She smoked an average of .1 packs per day. She has been exposed to tobacco smoke. She has never used smokeless tobacco. She reports that she does not currently use alcohol. She reports that she does not use drugs.    Medications:   Medications Prior to Admission   Medication Sig Dispense Refill Last Dose    dicyclomine (BENTYL) 20 MG tablet Take 1 tablet by mouth Every 6 (Six) Hours. 90 tablet 3 10/5/2023       Allergies:  Aloe and Lamotrigine        Objective     Blood pressure 96/59, pulse 52, temperature 98.4 °F (36.9 °C), temperature source Temporal, resp. rate 18, weight 81.7 kg (180 lb 1.9 oz), SpO2 94 %.    Physical Exam   Constitutional: Pt is oriented to person, place, and time and well-developed, well-nourished, and in no distress.   Mouth/Throat: Oropharynx is clear and moist.   Neck: Normal range of motion.   Cardiovascular: Normal rate, regular rhythm and normal heart sounds.     Pulmonary/Chest: Effort normal and breath sounds normal.   Abdominal: Soft. Nontender  Skin: Skin is warm and dry.   Psychiatric: Mood, memory, affect and judgment normal.     Assessment & Plan     Diagnosis:  Diarrhea  IBS    Anticipated Surgical Procedure:  colonoscopy    The risks, benefits, and alternatives of this procedure have been discussed with the patient or the responsible party- the patient understands and agrees to proceed.        '

## 2023-10-09 LAB
CYTO UR: NORMAL
LAB AP CASE REPORT: NORMAL
LAB AP CLINICAL INFORMATION: NORMAL
PATH REPORT.FINAL DX SPEC: NORMAL
PATH REPORT.GROSS SPEC: NORMAL

## 2023-12-21 ENCOUNTER — APPOINTMENT (OUTPATIENT)
Dept: GENERAL RADIOLOGY | Facility: HOSPITAL | Age: 33
End: 2023-12-21
Payer: COMMERCIAL

## 2023-12-21 ENCOUNTER — HOSPITAL ENCOUNTER (EMERGENCY)
Facility: HOSPITAL | Age: 33
Discharge: HOME | End: 2023-12-21
Attending: EMERGENCY MEDICINE
Payer: COMMERCIAL

## 2023-12-21 ENCOUNTER — APPOINTMENT (OUTPATIENT)
Dept: CT IMAGING | Facility: HOSPITAL | Age: 33
End: 2023-12-21
Payer: COMMERCIAL

## 2023-12-21 VITALS
SYSTOLIC BLOOD PRESSURE: 128 MMHG | RESPIRATION RATE: 18 BRPM | OXYGEN SATURATION: 95 % | HEIGHT: 63 IN | DIASTOLIC BLOOD PRESSURE: 78 MMHG | HEART RATE: 54 BPM | WEIGHT: 181 LBS | TEMPERATURE: 98.5 F | BODY MASS INDEX: 32.07 KG/M2

## 2023-12-21 DIAGNOSIS — S06.0X0A CONCUSSION WITHOUT LOSS OF CONSCIOUSNESS, INITIAL ENCOUNTER: Primary | ICD-10-CM

## 2023-12-21 PROCEDURE — 70450 CT HEAD/BRAIN W/O DYE: CPT

## 2023-12-21 PROCEDURE — 99284 EMERGENCY DEPT VISIT MOD MDM: CPT

## 2023-12-21 PROCEDURE — 63710000001 ONDANSETRON ODT 4 MG TABLET DISPERSIBLE

## 2023-12-21 PROCEDURE — 72050 X-RAY EXAM NECK SPINE 4/5VWS: CPT

## 2023-12-21 RX ORDER — ONDANSETRON 4 MG/1
4 TABLET, ORALLY DISINTEGRATING ORAL ONCE
Status: COMPLETED | OUTPATIENT
Start: 2023-12-21 | End: 2023-12-21

## 2023-12-21 RX ORDER — KETOROLAC TROMETHAMINE 30 MG/ML
30 INJECTION, SOLUTION INTRAMUSCULAR; INTRAVENOUS ONCE
Status: DISCONTINUED | OUTPATIENT
Start: 2023-12-21 | End: 2023-12-21

## 2023-12-21 RX ORDER — ONDANSETRON 2 MG/ML
4 INJECTION INTRAMUSCULAR; INTRAVENOUS ONCE
Status: DISCONTINUED | OUTPATIENT
Start: 2023-12-21 | End: 2023-12-21

## 2023-12-21 RX ORDER — IBUPROFEN 400 MG/1
800 TABLET ORAL ONCE
Status: COMPLETED | OUTPATIENT
Start: 2023-12-21 | End: 2023-12-21

## 2023-12-21 RX ADMIN — IBUPROFEN 800 MG: 400 TABLET ORAL at 12:57

## 2023-12-21 RX ADMIN — ONDANSETRON 4 MG: 4 TABLET, ORALLY DISINTEGRATING ORAL at 12:57

## 2023-12-21 NOTE — DISCHARGE INSTRUCTIONS
Please follow-up with your primary care provider as needed.  Return to the ED for any worsening of head pain, blurred vision, confusion, disturbance of balance, nausea or vomiting.  You may take Tylenol and ibuprofen for pain.

## 2023-12-21 NOTE — Clinical Note
[facility] EMERGENCY ROOM  [address]  [place] [address]  Phone: [phone]    Farideh Navarro was seen and treated in our emergency department on 12/21/2023.  She may return to work on 12/25/2023.         Thank you for choosing [facility].    Soraya Mosquera, APRN

## 2023-12-21 NOTE — ED PROVIDER NOTES
Time: 12:26 PM EST  Date of encounter:  2023  Independent Historian/Clinical History and Information was obtained by:   Patient    History is limited by: N/A    Chief Complaint: Headache      History of Present Illness:  Patient is a 33 y.o. year old female who presents to the emergency department for evaluation of headache and nausea after hitting the back of her head on the front of her car while putting coffee into it this morning.  Denies any vomiting.  Denies loss of consciousness at the time of the incident.  Denies any blurred vision.    HPI    Patient Care Team  Primary Care Provider: Vianca Mercado APRN    Past Medical History:     Allergies   Allergen Reactions    Aloe Hives    Lamotrigine Hives     Past Medical History:   Diagnosis Date    PONV (postoperative nausea and vomiting)      Past Surgical History:   Procedure Laterality Date    APPENDECTOMY       SECTION      COLONOSCOPY N/A 10/6/2023    Procedure: COLONOSCOPY WITH BIOPSIES/POLYPECTOMY/SNARE;  Surgeon: Connor Borjas MD;  Location: [facility] ENDOSCOPY;  Service: Gastroenterology;  Laterality: N/A;  RECTAL POLYP  NORMAL TERMINAL ILEUM    TUBAL ABDOMINAL LIGATION       Family History   Problem Relation Age of Onset    Hypertension Mother     Fibromyalgia Mother     Anxiety disorder Mother     Diabetes Father     Deep vein thrombosis Father     Kidney failure Father     Hypertension Maternal Grandmother     Skin cancer Maternal Grandmother     Hypertension Maternal Grandfather     Diabetes Maternal Grandfather     Crohn's disease Maternal Grandfather     COPD Paternal Grandmother     Heart attack Paternal Grandmother     Fibromyalgia Paternal Grandmother     Colon cancer Neg Hx        Home Medications:  Prior to Admission medications    Medication Sig Start Date End Date Taking? Authorizing Provider   dicyclomine (BENTYL) 20 MG tablet Take 1 tablet by mouth Every 6 (Six) Hours. 23   Nancy Levine APRN        Social  "History:   Social History     Tobacco Use    Smoking status: Former     Packs/day: .1     Types: Cigarettes     Quit date: 2023     Years since quittin.3     Passive exposure: Past    Smokeless tobacco: Never   Vaping Use    Vaping Use: Every day    Substances: Nicotine, Flavoring    Devices: Disposable   Substance Use Topics    Alcohol use: Not Currently    Drug use: Never         Review of Systems:  Review of Systems   Constitutional:  Negative for chills, fatigue and fever.   HENT:  Negative for ear pain, rhinorrhea and sore throat.    Eyes:  Negative for visual disturbance.   Respiratory:  Negative for cough and shortness of breath.    Cardiovascular:  Negative for chest pain.   Gastrointestinal:  Positive for nausea. Negative for abdominal pain, diarrhea and vomiting.   Genitourinary:  Negative for difficulty urinating.   Musculoskeletal:  Negative for arthralgias, back pain and myalgias.   Skin:  Negative for rash.   Neurological:  Positive for headaches. Negative for light-headedness.   Hematological:  Negative for adenopathy.   Psychiatric/Behavioral: Negative.          Physical Exam:  /78   Pulse 54   Temp 98.5 °F (36.9 °C) (Oral)   Resp 18   Ht 160 cm (63\")   Wt 82.1 kg (181 lb)   SpO2 95%   BMI 32.06 kg/m²     Physical Exam  Vitals and nursing note reviewed.   Constitutional:       General: She is not in acute distress.     Appearance: Normal appearance. She is not toxic-appearing.   HENT:      Head: Normocephalic and atraumatic.      Nose: Nose normal.      Mouth/Throat:      Mouth: Mucous membranes are moist.   Eyes:      Conjunctiva/sclera: Conjunctivae normal.   Cardiovascular:      Rate and Rhythm: Normal rate.      Pulses: Normal pulses.      Heart sounds: Normal heart sounds.   Pulmonary:      Effort: Pulmonary effort is normal.      Breath sounds: Normal breath sounds.   Abdominal:      General: Bowel sounds are normal.      Palpations: Abdomen is soft.      Tenderness: There " is no abdominal tenderness.   Musculoskeletal:         General: Normal range of motion.      Cervical back: Normal range of motion. Tenderness present.   Skin:     General: Skin is warm and dry.   Neurological:      General: No focal deficit present.      Mental Status: She is alert and oriented to person, place, and time.   Psychiatric:         Mood and Affect: Mood normal.         Behavior: Behavior normal.         Thought Content: Thought content normal.         Judgment: Judgment normal.                  Procedures:  Procedures      Medical Decision Making:      Comorbidities that affect care:    None    External Notes reviewed:    None      The following orders were placed and all results were independently analyzed by me:  Orders Placed This Encounter   Procedures    CT Head Without Contrast    XR Spine Cervical Complete 4 or 5 View       Medications Given in the Emergency Department:  Medications   ondansetron ODT (ZOFRAN-ODT) disintegrating tablet 4 mg (4 mg Oral Given 12/21/23 1257)   ibuprofen (ADVIL,MOTRIN) tablet 800 mg (800 mg Oral Given 12/21/23 1257)        ED Course:         Labs:    Lab Results (last 24 hours)       ** No results found for the last 24 hours. **             Imaging:    XR Spine Cervical Complete 4 or 5 View    Result Date: 12/21/2023  PROCEDURE: XR SPINE CERVICAL COMPLETE 4 OR 5 VW  COMPARISON: [facility], , XR SPINE CERVICAL 3 VW, 8/01/2022, 16:06.  INDICATIONS: head injury, Upper neck pain, injury today  FINDINGS:   BONES: Normal.  No significant spondylosis, scoliosis, fracture, or visible bony lesion.  DISC SPACES: Normal.  No significant disc height narrowing, subluxation, or endplate abnormality.  PARASPINOUS: Negative.  No paraspinous abnormality is seen.  OTHER: Negative.        Normal examination.      BRIAN LINARES MD       Electronically Signed and Approved By: BRIAN LINARES MD on 12/21/2023 at 13:39             CT Head Without Contrast    Result  Date: 12/21/2023  PROCEDURE: CT HEAD WO CONTRAST  COMPARISON:  None  INDICATIONS: head injury, nausea  PROTOCOL:   Standard imaging protocol performed    RADIATION:   DLP: 954.2 mGy*cm   MA and/or KV was adjusted to minimize radiation dose.    TECHNIQUE: CT images were obtained without non-ionic intravenous contrast material.  FINDINGS:  The ventricles are normal in size, position, and configuration.  Sulci are not abnormally prominent.  No abnormal gray or white matter density is appreciated.  There is no CT evidence of acute intracranial hemorrhage, mass, or mass effect.  The orbits have a normal appearance.  The paranasal sinuses, middle ears, and mastoid air cells are well aerated.  No fractures are seen on bone window images.        Negative CT scan of the head without IV contrast.     MODESTO TOTH MD       Electronically Signed and Approved By: MODESTO TOTH MD on 12/21/2023 at 12:57                Differential Diagnosis and Discussion:    Headache: Differential diagnosis includes but is not limited to migraine, cluster headache, hypertension, tumor, subarachnoid bleeding, pseudotumor cerebri, temporal arteritis, infections, tension headache, and TMJ syndrome.    All X-rays impressions were independently interpreted by me.  CT scan radiology impression was interpreted by me.    MDM     Amount and/or Complexity of Data Reviewed  Tests in the radiology section of CPT®: reviewed                 Patient Care Considerations:    NARCOTICS: I considered prescribing opiate pain medication as an outpatient, however patient's pain is well-controlled without use of narcotics in the ER.      Consultants/Shared Management Plan:    None    Social Determinants of Health:    Patient is independent, reliable, and has access to care.       Disposition and Care Coordination:    Discharged: The patient is suitable and stable for discharge with no need for consideration of observation or admission.    I have explained the  patient´s condition, diagnoses and treatment plan based on the information available to me at this time. I have answered questions and addressed any concerns. The patient has a good  understanding of the patient´s diagnosis, condition, and treatment plan as can be expected at this point. The vital signs have been stable. The patient´s condition is stable and appropriate for discharge from the emergency department.      The patient will pursue further outpatient evaluation with the primary care physician or other designated or consulting physician as outlined in the discharge instructions. They are agreeable to this plan of care and follow-up instructions have been explained in detail. The patient has received these instructions in written format and have expressed an understanding of the discharge instructions. The patient is aware that any significant change in condition or worsening of symptoms should prompt an immediate return to this or the closest emergency department or call to 911.  I have explained discharge medications and the need for follow up with the patient/caretakers. This was also printed in the discharge instructions. Patient was discharged with the following medications and follow up:      Medication List      No changes were made to your prescriptions during this visit.      Vianca Mercado, CECILIO  [address]  [facility] [place] [address]  [phone]    Go to   As needed       Final diagnoses:   Concussion without loss of consciousness, initial encounter        ED Disposition       ED Disposition   Discharge    Condition   Stable    Comment   --               This medical record created using voice recognition software.             Soraya Mosquera, APRN  12/21/23 [id number]

## 2023-12-26 NOTE — PROGRESS NOTES
"Chief Complaint  Annual Exam and Concussion (Patient had a concussion and wants to follow up on that )    Subjective          Farideh Navarro presents to [facility]  History of Present Illness    Had testing for Crohns. Found a benign cyst in colon. Had Cscope3 on 10/6/23 with Dr. Borjas. Having 3-5 year recalls.   Bentyl is controlling symptoms otherwise.   Denies melena, nausea, vomiting, fevers.     33-year-old female patient presents to the clinic for annual physical exam.   Denies chest pain, shortness of breath.     Current Outpatient Medications   Medication Instructions    dicyclomine (BENTYL) 20 mg, Oral, Every 6 Hours       The following portions of the patient's history were reviewed and updated as appropriate: allergies, current medications, past family history, past medical history, past social history, past surgical history, and problem list.    Objective   Vital Signs:   /71 (BP Location: Right arm, Patient Position: Sitting, Cuff Size: Adult)   Pulse 69   Temp 99.1 °F (37.3 °C) (Temporal)   Ht 160 cm (63\")   Wt 82.7 kg (182 lb 6.4 oz)   SpO2 97%   BMI 32.31 kg/m²     BP Readings from Last 3 Encounters:   12/27/23 103/71   12/21/23 128/78   10/06/23 (!) 88/69     Wt Readings from Last 3 Encounters:   12/27/23 82.7 kg (182 lb 6.4 oz)   12/21/23 82.1 kg (181 lb)   10/06/23 81.7 kg (180 lb 1.9 oz)         Physical Exam  Constitutional:       Appearance: She is obese.          Appearance: No acute distress, well-nourished  Head: normocephalic, atraumatic  Eyes: extraocular movements intact, no scleral icterus, no conjunctival injection  Ears, Nose, and Throat: external ears normal, nares patent, moist mucous membranes  Cardiovascular: regular rate and rhythm. no murmurs, rubs, or gallops. no edema  Respiratory: breathing comfortably, symmetric chest rise, clear to auscultation bilaterally. No wheezes, rales, or rhonchi.  Neuro: alert and " oriented to time, place, and person. Normal gait  Psych: normal mood and affect     Result Review :   The following data was reviewed by: CECILIO Tirado on 12/27/2023:  Common labs          12/27/2023    16:38   Common Labs   Glucose 91    BUN 10    Creatinine 0.84    Sodium 137    Potassium 4.0    Chloride 105    Calcium 9.1    Albumin 4.5    Total Bilirubin 0.4    Alkaline Phosphatase 68    AST (SGOT) 18    ALT (SGPT) 21    WBC 4.28    Hemoglobin 13.4    Hematocrit 40.4    Platelets 280    Total Cholesterol 158    Triglycerides 104    HDL Cholesterol 30    LDL Cholesterol  109             CT Head Without Contrast (12/21/2023 12:40)   XR Spine Cervical Complete 4 or 5 View (12/21/2023 13:34)   Tissue Pathology Exam (10/06/2023 08:26)     ED with Ang Barragan MD (12/21/2023)   Admission (Discharged) with Connor Borjas MD (10/06/2023)   Lab Results   Component Value Date    SARSANTIGEN Not Detected 08/03/2023    COVID19 Not Detected 08/19/2022    RAPFLUA Negative 08/03/2023    RAPFLUB Negative 08/03/2023    RAPSCRN Negative 08/19/2022    POCPREGUR Negative 08/03/2023    BILIRUBINUR Negative 08/03/2023       Procedures        Assessment and Plan    Diagnoses and all orders for this visit:    1. Class 1 obesity without serious comorbidity with body mass index (BMI) of 32.0 to 32.9 in adult, unspecified obesity type (Primary)  Assessment & Plan:  Patient's (Body mass index is 32.31 kg/m².) indicates that they are obese (BMI >30) with health conditions that include none . Weight is unchanged. BMI  is above average; BMI management plan is completed. We discussed low calorie, low carb based diet program, portion control, and increasing exercise.       2. Annual physical exam  -     CBC w AUTO Differential  -     Comprehensive metabolic panel  -     Lipid panel  -     TSH Rfx On Abnormal To Free T4    3. Screening for lipid disorders  -     Lipid panel    4. Screening for thyroid disorder  -     CBC w  AUTO Differential    5. Altered bowel habits  Overview:  Added automatically from request for surgery [id number]    Orders:  -     dicyclomine (BENTYL) 20 MG tablet; Take 1 tablet by mouth Every 6 (Six) Hours.  Dispense: 90 tablet; Refill: 3    6. Encounter for immunization  -     Tdap Vaccine => 6yo IM (BOOSTRIX)        Advised on diet, physical activity, sunscreen, helmet, texting and driving, etc    Medications Discontinued During This Encounter   Medication Reason    dicyclomine (BENTYL) 20 MG tablet Reorder          Follow Up   Return for Annual physical.  Patient was given instructions and counseling regarding her condition or for health maintenance advice. Please see specific information pulled into the AVS if appropriate.       Vianca Mercado, CECILIO  12/28/23  09:38 EST

## 2023-12-27 ENCOUNTER — OFFICE VISIT (OUTPATIENT)
Dept: INTERNAL MEDICINE | Facility: CLINIC | Age: 33
End: 2023-12-27
Payer: COMMERCIAL

## 2023-12-27 VITALS
OXYGEN SATURATION: 97 % | BODY MASS INDEX: 32.32 KG/M2 | HEART RATE: 69 BPM | SYSTOLIC BLOOD PRESSURE: 103 MMHG | DIASTOLIC BLOOD PRESSURE: 71 MMHG | HEIGHT: 63 IN | TEMPERATURE: 99.1 F | WEIGHT: 182.4 LBS

## 2023-12-27 DIAGNOSIS — R19.4 ALTERED BOWEL HABITS: ICD-10-CM

## 2023-12-27 DIAGNOSIS — Z23 ENCOUNTER FOR IMMUNIZATION: ICD-10-CM

## 2023-12-27 DIAGNOSIS — Z13.29 SCREENING FOR THYROID DISORDER: ICD-10-CM

## 2023-12-27 DIAGNOSIS — Z13.220 SCREENING FOR LIPID DISORDERS: ICD-10-CM

## 2023-12-27 DIAGNOSIS — E66.9 CLASS 1 OBESITY WITHOUT SERIOUS COMORBIDITY WITH BODY MASS INDEX (BMI) OF 32.0 TO 32.9 IN ADULT, UNSPECIFIED OBESITY TYPE: Primary | ICD-10-CM

## 2023-12-27 DIAGNOSIS — Z00.00 ANNUAL PHYSICAL EXAM: ICD-10-CM

## 2023-12-27 PROBLEM — E66.811 CLASS 1 OBESITY WITHOUT SERIOUS COMORBIDITY WITH BODY MASS INDEX (BMI) OF 32.0 TO 32.9 IN ADULT: Status: ACTIVE | Noted: 2023-12-27

## 2023-12-27 PROCEDURE — 80061 LIPID PANEL: CPT | Performed by: NURSE PRACTITIONER

## 2023-12-27 PROCEDURE — 80050 GENERAL HEALTH PANEL: CPT | Performed by: NURSE PRACTITIONER

## 2023-12-27 RX ORDER — DICYCLOMINE HCL 20 MG
20 TABLET ORAL EVERY 6 HOURS
Qty: 90 TABLET | Refills: 3 | Status: SHIPPED | OUTPATIENT
Start: 2023-12-27

## 2023-12-27 NOTE — ASSESSMENT & PLAN NOTE
Patient's (Body mass index is 32.31 kg/m².) indicates that they are obese (BMI >30) with health conditions that include none . Weight is unchanged. BMI  is above average; BMI management plan is completed. We discussed low calorie, low carb based diet program, portion control, and increasing exercise.

## 2023-12-28 LAB
ALBUMIN SERPL-MCNC: 4.5 G/DL (ref 3.5–5.2)
ALBUMIN/GLOB SERPL: 2 G/DL
ALP SERPL-CCNC: 68 U/L (ref 39–117)
ALT SERPL W P-5'-P-CCNC: 21 U/L (ref 1–33)
ANION GAP SERPL CALCULATED.3IONS-SCNC: 9 MMOL/L (ref 5–15)
AST SERPL-CCNC: 18 U/L (ref 1–32)
BASOPHILS # BLD AUTO: 0.04 10*3/MM3 (ref 0–0.2)
BASOPHILS NFR BLD AUTO: 0.9 % (ref 0–1.5)
BILIRUB SERPL-MCNC: 0.4 MG/DL (ref 0–1.2)
BUN SERPL-MCNC: 10 MG/DL (ref 6–20)
BUN/CREAT SERPL: 11.9 (ref 7–25)
CALCIUM SPEC-SCNC: 9.1 MG/DL (ref 8.6–10.5)
CHLORIDE SERPL-SCNC: 105 MMOL/L (ref 98–107)
CHOLEST SERPL-MCNC: 158 MG/DL (ref 0–200)
CO2 SERPL-SCNC: 23 MMOL/L (ref 22–29)
CREAT SERPL-MCNC: 0.84 MG/DL (ref 0.57–1)
DEPRECATED RDW RBC AUTO: 39.7 FL (ref 37–54)
EGFRCR SERPLBLD CKD-EPI 2021: 94.2 ML/MIN/1.73
EOSINOPHIL # BLD AUTO: 0.03 10*3/MM3 (ref 0–0.4)
EOSINOPHIL NFR BLD AUTO: 0.7 % (ref 0.3–6.2)
ERYTHROCYTE [DISTWIDTH] IN BLOOD BY AUTOMATED COUNT: 11.8 % (ref 12.3–15.4)
GLOBULIN UR ELPH-MCNC: 2.2 GM/DL
GLUCOSE SERPL-MCNC: 91 MG/DL (ref 65–99)
HCT VFR BLD AUTO: 40.4 % (ref 34–46.6)
HDLC SERPL-MCNC: 30 MG/DL (ref 40–60)
HGB BLD-MCNC: 13.4 G/DL (ref 12–15.9)
IMM GRANULOCYTES # BLD AUTO: 0.01 10*3/MM3 (ref 0–0.05)
IMM GRANULOCYTES NFR BLD AUTO: 0.2 % (ref 0–0.5)
LDLC SERPL CALC-MCNC: 109 MG/DL (ref 0–100)
LDLC/HDLC SERPL: 3.57 {RATIO}
LYMPHOCYTES # BLD AUTO: 1.66 10*3/MM3 (ref 0.7–3.1)
LYMPHOCYTES NFR BLD AUTO: 38.8 % (ref 19.6–45.3)
MCH RBC QN AUTO: 30.5 PG (ref 26.6–33)
MCHC RBC AUTO-ENTMCNC: 33.2 G/DL (ref 31.5–35.7)
MCV RBC AUTO: 92 FL (ref 79–97)
MONOCYTES # BLD AUTO: 0.25 10*3/MM3 (ref 0.1–0.9)
MONOCYTES NFR BLD AUTO: 5.8 % (ref 5–12)
NEUTROPHILS NFR BLD AUTO: 2.29 10*3/MM3 (ref 1.7–7)
NEUTROPHILS NFR BLD AUTO: 53.6 % (ref 42.7–76)
NRBC BLD AUTO-RTO: 0 /100 WBC (ref 0–0.2)
PLATELET # BLD AUTO: 280 10*3/MM3 (ref 140–450)
PMV BLD AUTO: 12.6 FL (ref 6–12)
POTASSIUM SERPL-SCNC: 4 MMOL/L (ref 3.5–5.2)
PROT SERPL-MCNC: 6.7 G/DL (ref 6–8.5)
RBC # BLD AUTO: 4.39 10*6/MM3 (ref 3.77–5.28)
SODIUM SERPL-SCNC: 137 MMOL/L (ref 136–145)
TRIGL SERPL-MCNC: 104 MG/DL (ref 0–150)
TSH SERPL DL<=0.05 MIU/L-ACNC: 3.49 UIU/ML (ref 0.27–4.2)
VLDLC SERPL-MCNC: 19 MG/DL (ref 5–40)
WBC NRBC COR # BLD AUTO: 4.28 10*3/MM3 (ref 3.4–10.8)

## 2024-04-29 ENCOUNTER — APPOINTMENT (OUTPATIENT)
Dept: GENERAL RADIOLOGY | Facility: HOSPITAL | Age: 34
End: 2024-04-29
Payer: COMMERCIAL

## 2024-04-29 ENCOUNTER — HOSPITAL ENCOUNTER (EMERGENCY)
Facility: HOSPITAL | Age: 34
Discharge: HOME | End: 2024-04-29
Attending: EMERGENCY MEDICINE | Admitting: EMERGENCY MEDICINE
Payer: COMMERCIAL

## 2024-04-29 VITALS
OXYGEN SATURATION: 99 % | WEIGHT: 181 LBS | HEART RATE: 68 BPM | RESPIRATION RATE: 17 BRPM | BODY MASS INDEX: 30.9 KG/M2 | DIASTOLIC BLOOD PRESSURE: 76 MMHG | SYSTOLIC BLOOD PRESSURE: 110 MMHG | TEMPERATURE: 98 F | HEIGHT: 64 IN

## 2024-04-29 DIAGNOSIS — M16.12 PRIMARY OSTEOARTHRITIS OF LEFT HIP: Primary | ICD-10-CM

## 2024-04-29 PROCEDURE — 96372 THER/PROPH/DIAG INJ SC/IM: CPT

## 2024-04-29 PROCEDURE — 99283 EMERGENCY DEPT VISIT LOW MDM: CPT

## 2024-04-29 PROCEDURE — 25010000002 DEXAMETHASONE SODIUM PHOSPHATE 10 MG/ML SOLUTION

## 2024-04-29 PROCEDURE — 73502 X-RAY EXAM HIP UNI 2-3 VIEWS: CPT

## 2024-04-29 PROCEDURE — 25010000002 KETOROLAC TROMETHAMINE PER 15 MG

## 2024-04-29 RX ORDER — DEXAMETHASONE SODIUM PHOSPHATE 10 MG/ML
10 INJECTION, SOLUTION INTRAMUSCULAR; INTRAVENOUS ONCE
Status: COMPLETED | OUTPATIENT
Start: 2024-04-29 | End: 2024-04-29

## 2024-04-29 RX ORDER — KETOROLAC TROMETHAMINE 10 MG/1
10 TABLET, FILM COATED ORAL EVERY 6 HOURS PRN
Qty: 15 TABLET | Refills: 0 | Status: SHIPPED | OUTPATIENT
Start: 2024-04-29

## 2024-04-29 RX ORDER — KETOROLAC TROMETHAMINE 30 MG/ML
30 INJECTION, SOLUTION INTRAMUSCULAR; INTRAVENOUS ONCE
Status: COMPLETED | OUTPATIENT
Start: 2024-04-29 | End: 2024-04-29

## 2024-04-29 RX ORDER — PREDNISONE 20 MG/1
20 TABLET ORAL DAILY
Qty: 5 TABLET | Refills: 0 | Status: SHIPPED | OUTPATIENT
Start: 2024-04-29 | End: 2024-05-04

## 2024-04-29 RX ADMIN — KETOROLAC TROMETHAMINE 30 MG: 30 INJECTION, SOLUTION INTRAMUSCULAR; INTRAVENOUS at 20:47

## 2024-04-29 RX ADMIN — DEXAMETHASONE SODIUM PHOSPHATE 10 MG: 10 INJECTION INTRAMUSCULAR; INTRAVENOUS at 20:46

## 2024-04-29 NOTE — DISCHARGE INSTRUCTIONS
Please follow-up with your primary care provider as needed.  Return to the ED for worsening pain, numbness or tingling of your extremities loss of control of your bowel or bladder.

## 2024-04-29 NOTE — ED PROVIDER NOTES
Time: 7:39 PM EDT  Date of encounter:  2024  Independent Historian/Clinical History and Information was obtained by:   Patient    History is limited by: N/A    Chief Complaint: Hip pain      History of Present Illness:  Patient is a 33 y.o. year old female who presents to the emergency department for evaluation of right hip pain and chronic low back pain that has been worsened after hiking 2 days ago.  Patient reports previous dislocation of the right hip and chronic pain since that time.  Reports she has been able to walk on the extremity however that does increase her pain.  Denies saddle anesthesia or loss of control of bowel or bladder.    HPI    Patient Care Team  Primary Care Provider: Vianca Mercado APRN    Past Medical History:     Allergies   Allergen Reactions    Aloe Hives    Lamotrigine Hives     Past Medical History:   Diagnosis Date    PONV (postoperative nausea and vomiting)      Past Surgical History:   Procedure Laterality Date    APPENDECTOMY       SECTION      COLONOSCOPY N/A 10/6/2023    Procedure: COLONOSCOPY WITH BIOPSIES/POLYPECTOMY/SNARE;  Surgeon: Connor Borjas MD;  Location: [facility] ENDOSCOPY;  Service: Gastroenterology;  Laterality: N/A;  RECTAL POLYP  NORMAL TERMINAL ILEUM    TUBAL ABDOMINAL LIGATION       Family History   Problem Relation Age of Onset    Hypertension Mother     Fibromyalgia Mother     Anxiety disorder Mother     Diabetes Father     Deep vein thrombosis Father     Kidney failure Father     Hypertension Maternal Grandmother     Skin cancer Maternal Grandmother     Hypertension Maternal Grandfather     Diabetes Maternal Grandfather     Crohn's disease Maternal Grandfather     COPD Paternal Grandmother     Heart attack Paternal Grandmother     Fibromyalgia Paternal Grandmother     Colon cancer Neg Hx        Home Medications:  Prior to Admission medications    Medication Sig Start Date End Date Taking? Authorizing Provider   dicyclomine (BENTYL) 20  "MG tablet Take 1 tablet by mouth Every 6 (Six) Hours. 23   Vianca Mercado APRN   ketorolac (TORADOL) 10 MG tablet Take 1 tablet by mouth Every 6 (Six) Hours As Needed for Moderate Pain. 24   Soraya Mosquera APRN   predniSONE (DELTASONE) 20 MG tablet Take 1 tablet by mouth Daily for 5 days. 24  Soraya Mosquera APRN        Social History:   Social History     Tobacco Use    Smoking status: Former     Current packs/day: 0.00     Types: Cigarettes     Quit date: 2023     Years since quittin.7     Passive exposure: Past    Smokeless tobacco: Never   Vaping Use    Vaping status: Every Day    Substances: Nicotine, Flavoring    Devices: Disposable   Substance Use Topics    Alcohol use: Not Currently    Drug use: Never         Review of Systems:  Review of Systems   Constitutional:  Negative for chills, fatigue and fever.   HENT:  Negative for ear pain, rhinorrhea and sore throat.    Eyes:  Negative for visual disturbance.   Respiratory:  Negative for cough and shortness of breath.    Cardiovascular:  Negative for chest pain.   Gastrointestinal:  Negative for abdominal pain, diarrhea and vomiting.   Genitourinary:  Negative for difficulty urinating.   Musculoskeletal:  Positive for arthralgias and back pain. Negative for myalgias.   Skin:  Negative for rash.   Neurological:  Negative for light-headedness and headaches.   Hematological:  Negative for adenopathy.   Psychiatric/Behavioral: Negative.          Physical Exam:  /71   Pulse 64   Temp 97.9 °F (36.6 °C) (Oral)   Resp 18   Ht 162.6 cm (64\")   Wt 82.1 kg (181 lb)   SpO2 99%   BMI 31.07 kg/m²     Physical Exam  Vitals and nursing note reviewed.   Constitutional:       General: She is not in acute distress.     Appearance: Normal appearance. She is not toxic-appearing.   HENT:      Head: Normocephalic and atraumatic.      Nose: Nose normal.      Mouth/Throat:      Mouth: Mucous membranes are moist.   Eyes:      " Conjunctiva/sclera: Conjunctivae normal.   Cardiovascular:      Rate and Rhythm: Normal rate.      Pulses: Normal pulses.   Pulmonary:      Effort: Pulmonary effort is normal.   Abdominal:      Palpations: Abdomen is soft.      Tenderness: There is no abdominal tenderness.   Musculoskeletal:         General: Normal range of motion.      Cervical back: Normal range of motion.   Skin:     General: Skin is warm and dry.   Neurological:      General: No focal deficit present.      Mental Status: She is alert and oriented to person, place, and time.   Psychiatric:         Mood and Affect: Mood normal.         Behavior: Behavior normal.         Thought Content: Thought content normal.         Judgment: Judgment normal.                  Procedures:  Procedures      Medical Decision Making:      Comorbidities that affect care:    None    External Notes reviewed:    None      The following orders were placed and all results were independently analyzed by me:  Orders Placed This Encounter   Procedures    XR Hip With or Without Pelvis 2 - 3 View Right       Medications Given in the Emergency Department:  Medications   dexAMETHasone sodium phosphate injection 10 mg (has no administration in time range)   ketorolac (TORADOL) injection 30 mg (has no administration in time range)        ED Course:         Labs:    Lab Results (last 24 hours)       ** No results found for the last 24 hours. **             Imaging:    XR Hip With or Without Pelvis 2 - 3 View Right    Result Date: 4/29/2024  EXAMINATION: XR HIP W OR WO PELVIS 2-3 VIEW RIGHT-  DATE OF EXAM: 4/29/2024 4:32 PM  INDICATION: Pain, injury.  COMPARISON: None available.  TECHNIQUE: 3 views of the right hip were obtained.  FINDINGS: There is no acute fracture or dislocation. The SI joints appear symphysis are normally aligned. The ilioischial and iliopectineal lines appear intact. The hip joint is in normal alignment. There are tubal ligation clips within the pelvis.      1.  No acute osseous abnormality of the pelvis or right hip.  Electronically Signed By-Daniele Garcia MD On:4/29/2024 5:05 PM         Differential Diagnosis and Discussion:    Extremity Pain: Differential diagnosis includes but is not limited to soft tissue sprain, tendonitis, tendon injury, dislocation, fracture, deep vein thrombosis, arterial insufficiency, osteoarthritis, bursitis, and ligamentous damage.    All X-rays impressions were independently interpreted by me.    MDM     Amount and/or Complexity of Data Reviewed  Tests in the radiology section of CPT®: reviewed               Patient Care Considerations:    NARCOTICS: I considered prescribing opiate pain medication as an outpatient, however pain is manageable without the use of narcotics in the ED.      Consultants/Shared Management Plan:    None    Social Determinants of Health:    Patient is independent, reliable, and has access to care.       Disposition and Care Coordination:    Discharged: The patient is suitable and stable for discharge with no need for consideration of admission.    I have explained the patient´s condition, diagnoses and treatment plan based on the information available to me at this time. I have answered questions and addressed any concerns. The patient has a good  understanding of the patient´s diagnosis, condition, and treatment plan as can be expected at this point. The vital signs have been stable. The patient´s condition is stable and appropriate for discharge from the emergency department.      The patient will pursue further outpatient evaluation with the primary care physician or other designated or consulting physician as outlined in the discharge instructions. They are agreeable to this plan of care and follow-up instructions have been explained in detail. The patient has received these instructions in written format and has expressed an understanding of the discharge instructions. The patient is aware that any significant  change in condition or worsening of symptoms should prompt an immediate return to this or the closest emergency department or call to 911.  I have explained discharge medications and the need for follow up with the patient/caretakers. This was also printed in the discharge instructions. Patient was discharged with the following medications and follow up:      Medication List        New Prescriptions      ketorolac 10 MG tablet  Commonly known as: TORADOL  Take 1 tablet by mouth Every 6 (Six) Hours As Needed for Moderate Pain.     predniSONE 20 MG tablet  Commonly known as: DELTASONE  Take 1 tablet by mouth Daily for 5 days.               Where to Get Your Medications        These medications were sent to [facility]/pharmacy #[id number] - [place], [place] - [address] - [phone]  - [phone] FX  [address] [place] [address]      Hours: 24-hours Phone: [phone]   ketorolac 10 MG tablet  predniSONE 20 MG tablet      Vianca Mercado, APRN  [address]  [address]  [place] [address]  [phone]    Go to   As needed       Final diagnoses:   Primary osteoarthritis of left hip        ED Disposition       ED Disposition   Discharge    Condition   Stable    Comment   --               This medical record created using voice recognition software.             Soraya Mosquera, APRN  04/29/24 1940

## 2024-04-29 NOTE — Clinical Note
[facility] EMERGENCY ROOM  [address]  [place] [address]  Phone: [phone]  Fax: [phone]    Farideh Navarro was seen and treated in our emergency department on 4/29/2024.  She may return to work on 04/30/2024.         Thank you for choosing [facility].    Soraya Mosquera, APRN

## 2024-05-01 NOTE — PROGRESS NOTES
"Chief Complaint  Hip Pain (Right Hip ER follow up)    Subjective          Farideh Navarro presents to [facility]  History of Present Illness  4/20 went hiking with kids in her crocs. The next day she woke up and was in pain, but it was \"not that bad\". States that the pain continued to worsen over the week. Then she noticed swelling and a small bruise to the area. States that driving and stairs make the pain worse.   Gave her medication in the ED that \"took the edge off\".   Hip Pain   The pain is present in the left hip. Pertinent negatives include no inability to bear weight, loss of motion, loss of sensation, muscle weakness, numbness or tingling. The symptoms are aggravated by movement, palpation and weight bearing. She has tried acetaminophen, heat and ice for the symptoms. The treatment provided no relief.         Current Outpatient Medications   Medication Instructions    diclofenac (VOLTAREN) 75 mg, Oral, 2 Times Daily    dicyclomine (BENTYL) 20 mg, Oral, Every 6 Hours    ketorolac (TORADOL) 10 mg, Oral, Every 6 Hours PRN    predniSONE (DELTASONE) 20 mg, Oral, Daily       The following portions of the patient's history were reviewed and updated as appropriate: allergies, current medications, past family history, past medical history, past social history, past surgical history, and problem list.    Objective   Vital Signs:   /73 (BP Location: Left arm, Patient Position: Sitting, Cuff Size: Adult)   Pulse 69   Temp 98 °F (36.7 °C) (Temporal)   Ht 162.6 cm (64\")   Wt 85.7 kg (189 lb)   SpO2 97%   BMI 32.44 kg/m²     BP Readings from Last 3 Encounters:   05/02/24 105/73   04/29/24 110/76   12/27/23 103/71     Wt Readings from Last 3 Encounters:   05/02/24 85.7 kg (189 lb)   04/29/24 82.1 kg (181 lb)   12/27/23 82.7 kg (182 lb 6.4 oz)           Physical Exam  Musculoskeletal:      Left hip: Tenderness present. No deformity, lacerations, bony " tenderness or crepitus. Decreased range of motion. Normal strength.          Appearance: No acute distress, well-nourished  Head: normocephalic, atraumatic  Eyes: extraocular movements intact, no scleral icterus, no conjunctival injection  Ears, Nose, and Throat: external ears normal, nares patent, moist mucous membranes  Cardiovascular: regular rate and rhythm. no murmurs, rubs, or gallops. no edema  Respiratory: breathing comfortably, symmetric chest rise, clear to auscultation bilaterally. No wheezes, rales, or rhonchi.  Neuro: alert and oriented to time, place, and person. Normal gait  Psych: normal mood and affect     Result Review :   The following data was reviewed by: CECILIO Tirado on 05/02/2024:  Common labs          12/27/2023    16:38   Common Labs   Glucose 91    BUN 10    Creatinine 0.84    Sodium 137    Potassium 4.0    Chloride 105    Calcium 9.1    Albumin 4.5    Total Bilirubin 0.4    Alkaline Phosphatase 68    AST (SGOT) 18    ALT (SGPT) 21    WBC 4.28    Hemoglobin 13.4    Hematocrit 40.4    Platelets 280    Total Cholesterol 158    Triglycerides 104    HDL Cholesterol 30    LDL Cholesterol  109             Narrative & Impression   EXAMINATION: XR HIP W OR WO PELVIS 2-3 VIEW RIGHT-     DATE OF EXAM: 4/29/2024 4:32 PM     INDICATION: Pain, injury.     COMPARISON: None available.     TECHNIQUE: 3 views of the right hip were obtained.     FINDINGS:  There is no acute fracture or dislocation. The SI joints appear  symphysis are normally aligned. The ilioischial and iliopectineal lines  appear intact. The hip joint is in normal alignment. There are tubal  ligation clips within the pelvis.     IMPRESSION:  1. No acute osseous abnormality of the pelvis or right hip.        XR Hip With or Without Pelvis 2 - 3 View Right (04/29/2024 16:42)     ED with Slick Love DO (04/29/2024)     Lab Results   Component Value Date    SARSANTIGEN Not Detected 08/03/2023    COVID19 Not Detected 08/19/2022     RAPFLUA Negative 08/03/2023    RAPFLUB Negative 08/03/2023    RAPSCRN Negative 08/19/2022    POCPREGUR Negative 08/03/2023    BILIRUBINUR Negative 08/03/2023       Procedures        Assessment and Plan    Diagnoses and all orders for this visit:    1. Bursitis of left hip, unspecified bursa (Primary)  -     diclofenac (VOLTAREN) 75 MG EC tablet; Take 1 tablet by mouth 2 (Two) Times a Day.  Dispense: 60 tablet; Refill: 1  -     Ambulatory Referral to Physical Therapy      - Stop toradol, use diclofenac.     There are no discontinued medications.       Follow Up   Return if symptoms worsen or fail to improve.  Patient was given instructions and counseling regarding her condition or for health maintenance advice. Please see specific information pulled into the AVS if appropriate.       CECILIO Tirado  05/02/24  08:31 EDT

## 2024-05-02 ENCOUNTER — OFFICE VISIT (OUTPATIENT)
Dept: INTERNAL MEDICINE | Facility: CLINIC | Age: 34
End: 2024-05-02
Payer: COMMERCIAL

## 2024-05-02 VITALS
HEIGHT: 64 IN | WEIGHT: 189 LBS | DIASTOLIC BLOOD PRESSURE: 73 MMHG | TEMPERATURE: 98 F | HEART RATE: 69 BPM | SYSTOLIC BLOOD PRESSURE: 105 MMHG | OXYGEN SATURATION: 97 % | BODY MASS INDEX: 32.27 KG/M2

## 2024-05-02 DIAGNOSIS — M70.72 BURSITIS OF LEFT HIP, UNSPECIFIED BURSA: Primary | ICD-10-CM

## 2024-05-02 PROCEDURE — 99213 OFFICE O/P EST LOW 20 MIN: CPT | Performed by: NURSE PRACTITIONER

## 2024-05-02 RX ORDER — DICLOFENAC SODIUM 75 MG/1
75 TABLET, DELAYED RELEASE ORAL 2 TIMES DAILY
Qty: 60 TABLET | Refills: 1 | Status: SHIPPED | OUTPATIENT
Start: 2024-05-02

## 2024-05-02 NOTE — LETTER
May 2, 2024     Patient: Farideh Navarro   YOB: 1990   Date of Visit: 5/2/2024       To Whom It May Concern:    It is my medical opinion that Farideh Navarro may return to light duty immediately with the following restrictions: no prolonged sitting or prolonged standing until the completion of physical therapy .           Sincerely,        CECILIO Tirado

## 2024-05-16 ENCOUNTER — TREATMENT (OUTPATIENT)
Dept: PHYSICAL THERAPY | Facility: CLINIC | Age: 34
End: 2024-05-16
Payer: COMMERCIAL

## 2024-05-16 DIAGNOSIS — M25.551 RIGHT HIP PAIN: ICD-10-CM

## 2024-05-16 DIAGNOSIS — M62.81 MUSCLE WEAKNESS OF PROXIMAL EXTREMITY: ICD-10-CM

## 2024-05-16 DIAGNOSIS — R26.9 GAIT DISTURBANCE: ICD-10-CM

## 2024-05-16 DIAGNOSIS — M54.50 RIGHT LOW BACK PAIN, UNSPECIFIED CHRONICITY, UNSPECIFIED WHETHER SCIATICA PRESENT: Primary | ICD-10-CM

## 2024-05-16 NOTE — PROGRESS NOTES
Physical Therapy Initial Evaluation and Plan of Care      [place] [facility]: [address] [place] [address]      Patient: Farideh Navarro   : 1990  Diagnosis/ICD-10 Code:  Right low back pain, unspecified chronicity, unspecified whether sciatica present [M54.50]  Referring practitioner: JULIEN Tirado*  Date of Initial Visit: 2024  Today's Date: 2024  Patient seen for 1 sessions           Subjective Questionnaire: Oswestry: 15/45      Subjective   Pt reports to physical therapy w/ complaints of R hip pain. Pt reports they were hiking w/ poor shoes. Pt reports they do sit a lot as well and stairs seem to increase their pain. Pt reports the pain was originally lateral and is now moving towards their groin. Pt reports they do have pain that also travels to their toes on their R foot. This is more inconsistent, and seems to be more w/ their low back.     Since pt has changed their shoe wear and has been more active, which seems to have helped their pain. Pt reports their current level of pain is a 5/10 and is considered an ache. Pt reports the shooting pain can get up to a 7-8/10, but this is brief. Immediately preceding or following the shooting pain, they can experience muscle spasms in their R LE. Pt reports they can get to a 0/10. This is achieved w/ a heating pad, rest, lower levels of walking.       Pt occupation: mental health therapist       Past Medical Hx: HTN w/ pregnancy, not present now. Scoliosis (no current xrays, reported 7 deg curve)      Objective          Tenderness     Right Hip   Tenderness in the PSIS and greater trochanter.     Additional Tenderness Details  L5 central PA and B PA increase pain      Neurological Testing     Sensation     Lumbar   Left   Intact: light touch    Right   Intact: light touch    Active Range of Motion     Lumbar   Flexion: 23 degrees   Extension: 25 degrees   Left Hip   External rotation (90/90): 34 degrees   Internal  rotation (90/90): 24 degrees     Right Hip   External rotation (90/90): 30 degrees   Internal rotation (90/90): 14 degrees     Additional Active Range of Motion Details  No pain w/ motion  L SB: 8cm past jointline  R SB: 4cm past jointline.     Strength/Myotome Testing     Left Hip   Planes of Motion   Flexion: 5  Extension: 4+  Abduction: 4+    Right Hip   Planes of Motion   Flexion: 4+  Extension: 4+  Abduction: 4    Left Knee   Flexion: 5  Extension: 5    Right Knee   Flexion: 5  Extension: 5    Tests     Right Pelvic Girdle/Sacrum   Positive: thigh thrust.   Negative: sacral spring.     Right Hip   Positive JANET.   Negative scour, SI compression and SI distraction.     Ambulation     Observational Gait   Gait: antalgic   Decreased right stance time.     Comments   No AD      See Exercise, Manual, and Modality Logs for complete treatment.       Assessment & Plan       Assessment  Impairments: abnormal coordination, abnormal gait, abnormal muscle firing, abnormal or restricted ROM, activity intolerance, impaired physical strength, lacks appropriate home exercise program and pain with function   Functional limitations: carrying objects, lifting, walking, pulling, uncomfortable because of pain, sitting, standing, stooping and unable to perform repetitive tasks   Assessment details: Pt reports to physical therapy w/ complaints of R hip pain. Pt's pain is reproduced w/ low back testing today. Pt presents w/ decreased ROM, decreased strength, and pain w/ daily activities and prolonged positions. Pt has increased perceived deficits described by NU. Pt does have relief of their pain today after performance of HEP. Pt will benefit from skilled physical therapy, to address their current impairments and limitations, in order to improve upon their levels of pain w/ sustained positions and ADLs.  Prognosis: good    Goals  Plan Goals: LOW BACK PROBLEMS:    1. The patient complains of low back pain.  LTG 1: 12 weeks:  The  patient will report a pain rating of 0/10 or better in order to improve  tolerance to activities of daily living and work tasks.  STATUS:  New  STG 1a: 6 weeks:  The patient will report a pain rating of 3/10 or better.  STATUS:  New  TREATMENT:  Therapeutic exercises, manual therapy, aquatic therapy, home exercise   instruction, and modalities as needed for pain to include:  electrical stimulation, moist heat, and ice.      2. The patient demonstrates weakness of the R hip.  LTG 2: 12 weeks:  The patient will demonstrate 5 /5 strength for R hip flexion, abduction,  and extension in order to improve hip stability.  STATUS:  New  STG 2a: 6 weeks:  The patient will demonstrate 4+ /5 strength for R hip flexion, abduction,  and extension.  STATUS:  New  TREATMENT: Therapeutic exercises, manual therapy, aquatic therapy, home exercise instruction,  and modalities as needed for pain to include:  electrical stimulation, moist heat, and ice.      3. Mobility: Walking/Moving Around Functional Limitation    LTG 3: 12 weeks:  The patient will demonstrate 11 % limitation by achieving a score of 5/45 on the NU.  STATUS:  New  STG 3a: 6 weeks:  The patient will demonstrate 22 % limitation by achieving a score of 10/45 on the NU.    STATUS:  New  TREATMENT:  Manual therapy, therapeutic exercise, home exercise instruction, and modalities as needed to include: moist heat, electrical stimulation, and ultrasound.           PLAN:  Therapy options: will receive skilled therapy services  Planned modality interventions: Cryotherapy, Heat, and TENS  Planned therapy interventions:balance/weight-bearing training, ADL retraining, soft tissue mobilization, strengthening, stretching, therapeutic activities, manual therapy, joint mobilization, home exercise program/patient education, gait training, functional ROM exercises, flexibility, body mechanics training, postural training, and neuromuscular re-education  Frequency: 2x per week  Duration  in weeks: 12  Treatment plan discussed with: patient      Visit Diagnoses:    ICD-10-CM ICD-9-CM   1. Right low back pain, unspecified chronicity, unspecified whether sciatica present  M54.50 724.2   2. Right hip pain  M25.551 719.45   3. Gait disturbance  R26.9 781.2   4. Muscle weakness of proximal extremity  M62.81 728.87       History # of Personal Factors and/or Comorbidities: LOW (0)  Examination of Body System(s): # of elements: LOW (1-2)  Clinical Presentation: STABLE   Clinical Decision Making: LOW       Timed:         Manual Therapy:    0     mins  25105;     Therapeutic Exercise:    25     mins  65332;     Neuromuscular [place]:    0    mins  05315;    Therapeutic Activity:     0     mins  30958;     Gait Trainin     mins  97262;     Ultrasound:     0     mins  06156;    Ionto                               0    mins   87007  Self Care                       0     mins   16299  Canalith Repos    0     mins 50324      Un-Timed:  Electrical Stimulation:    0     mins  51733 (MC );  Dry Needling     0     mins self-pay  Traction     0     mins 42624  Low Eval     25     Mins  89817  Mod Eval     0     Mins  58748  High Eval                       0     Mins  55094  Re-Eval                           0    mins  12599    Timed Treatment:   25   mins   Total Treatment:     50   mins    PT SIGNATURE: Compa Rae PT, DPT    Electronically signed 2024    [place] License: PT - [id number]    Initial Certification  Certification Period: 2024 thru 2024  I certify that the therapy services are furnished while this patient is under my care.  The services outlined above are required by this patient, and will be reviewed every 90 days.     PHYSICIAN: Vianca Mercado APRN  NPI: [id number]      DATE:     Please sign and return via fax to [phone]. Thank you, [facility].

## 2024-05-17 ENCOUNTER — PATIENT ROUNDING (BHMG ONLY) (OUTPATIENT)
Dept: URGENT CARE | Facility: CLINIC | Age: 34
End: 2024-05-17
Payer: COMMERCIAL

## 2024-05-17 NOTE — ED NOTES
Thank you for letting us care for you in your recent visit to our urgent care center. We would love to hear about your experience with us. Was this the first time you have visited our location?    We’re always looking for ways to make our patients’ experiences even better. Do you have any recommendations on ways we may improve?     I appreciate you taking the time to respond. Please be on the lookout for a survey about your recent visit from [employer] via text or email. We would greatly appreciate if you could fill that out and turn it back in. We want your voice to be heard and we value your feedback.   Thank you for choosing [facility] for your healthcare needs.

## 2024-05-21 ENCOUNTER — TREATMENT (OUTPATIENT)
Dept: PHYSICAL THERAPY | Facility: CLINIC | Age: 34
End: 2024-05-21
Payer: COMMERCIAL

## 2024-05-21 DIAGNOSIS — M54.50 RIGHT LOW BACK PAIN, UNSPECIFIED CHRONICITY, UNSPECIFIED WHETHER SCIATICA PRESENT: Primary | ICD-10-CM

## 2024-05-21 DIAGNOSIS — M62.81 MUSCLE WEAKNESS OF PROXIMAL EXTREMITY: ICD-10-CM

## 2024-05-21 DIAGNOSIS — M25.551 RIGHT HIP PAIN: ICD-10-CM

## 2024-05-21 DIAGNOSIS — R26.9 GAIT DISTURBANCE: ICD-10-CM

## 2024-05-21 PROCEDURE — 97110 THERAPEUTIC EXERCISES: CPT

## 2024-05-21 PROCEDURE — 97530 THERAPEUTIC ACTIVITIES: CPT

## 2024-05-21 NOTE — PROGRESS NOTES
Physical Therapy Daily Treatment Note  [facility]: [address] [facility] [place] [address]      Patient: Farideh Navarro   : 1990  Diagnosis/ICD-10 Code:  Right low back pain, unspecified chronicity, unspecified whether sciatica present [M54.50]  Referring practitioner: JULIEN Tirado*  Date of Initial Visit: Type: THERAPY  Noted: 2024  Today's Date: 2024  Patient seen for 2 sessions           Subjective   The patient reported they have no pain at this time. Pt reports they have noticed their numbness switching sides, to more of their L anterior hip.     Objective   See Exercise, Manual, and Modality Logs for complete treatment.     Assessment/Plan  Pt has decreased sensation of numbness w/ knees to chest exs. This is added to their HEP. Pt does not have increased pain w/ exs today. Patient will continue to benefit from skilled physical therapy to further address their deficits in strength and stability to decrease their overall pain and complaints of numbness.          Timed:  Manual Therapy:    0     mins  57119;  Therapeutic Exercise:    24     mins  34822;     Neuromuscular [place]:   0    mins  91341;    Therapeutic Activity:     10     mins  24129;     Gait Trainin     mins  23891;     Aquatics                         0      mins  69729    Un-timed:  Mechanical Traction      0     mins  41858  Electrical Stimulation:    0     mins  01477 ( );      Timed Treatment:   34   mins   Total Treatment:     34   mins    Compa Rae PT, DPT    Electronically signed 2024    [place] License: PT - [id number]

## 2024-05-23 ENCOUNTER — TREATMENT (OUTPATIENT)
Dept: PHYSICAL THERAPY | Facility: CLINIC | Age: 34
End: 2024-05-23
Payer: COMMERCIAL

## 2024-05-23 DIAGNOSIS — M25.551 RIGHT HIP PAIN: ICD-10-CM

## 2024-05-23 DIAGNOSIS — R26.9 GAIT DISTURBANCE: ICD-10-CM

## 2024-05-23 DIAGNOSIS — M54.50 RIGHT LOW BACK PAIN, UNSPECIFIED CHRONICITY, UNSPECIFIED WHETHER SCIATICA PRESENT: Primary | ICD-10-CM

## 2024-05-23 DIAGNOSIS — M62.81 MUSCLE WEAKNESS OF PROXIMAL EXTREMITY: ICD-10-CM

## 2024-05-23 NOTE — PROGRESS NOTES
Physical Therapy Daily Treatment Note  [facility]: [address] [place] [address]      Patient: Farideh Navarro   : 1990  Diagnosis/ICD-10 Code:  Right low back pain, unspecified chronicity, unspecified whether sciatica present [M54.50]  Referring practitioner: JULIEN Tirado*  Date of Initial Visit: Type: THERAPY  Noted: 2024  Today's Date: 2024  Patient seen for 3 sessions           Subjective   The patient reported muscle soreness after the last therapy session. Pt reports the numbness seems to be moving up, now a the level of their stomach.     Objective   See Exercise, Manual, and Modality Logs for complete treatment.     Assessment/Plan  Pt tolerates therapy session well today. Pt has improved form w/ exs and continues to have reduction of numbness w/ knees to chest exs. Patient will continue to benefit from skilled physical therapy to further address their deficits in strength and core stability in order to improve upon their functional mobility and to return to work tasks and ADLs w/ decreased pain.          Timed:  Manual Therapy:    0     mins  17406;  Therapeutic Exercise:    23     mins  23387;     Neuromuscular [place]:   0    mins  66750;    Therapeutic Activity:     8     mins  31569;     Gait Trainin     mins  37322;     Aquatics                         0      mins  35008    Un-timed:  Mechanical Traction      0     mins  76601  Electrical Stimulation:    0     mins  33121 ( );      Timed Treatment:   31   mins   Total Treatment:     31   mins    Compa Rae PT, DPT    Electronically signed 2024    [place] License: PT - [id number]

## 2024-06-13 ENCOUNTER — TREATMENT (OUTPATIENT)
Dept: PHYSICAL THERAPY | Facility: CLINIC | Age: 34
End: 2024-06-13
Payer: COMMERCIAL

## 2024-06-13 DIAGNOSIS — R26.9 GAIT DISTURBANCE: ICD-10-CM

## 2024-06-13 DIAGNOSIS — M54.50 RIGHT LOW BACK PAIN, UNSPECIFIED CHRONICITY, UNSPECIFIED WHETHER SCIATICA PRESENT: Primary | ICD-10-CM

## 2024-06-13 DIAGNOSIS — M25.551 RIGHT HIP PAIN: ICD-10-CM

## 2024-06-13 DIAGNOSIS — M62.81 MUSCLE WEAKNESS OF PROXIMAL EXTREMITY: ICD-10-CM

## 2024-06-13 NOTE — PROGRESS NOTES
Progress Note   [facility] [facility]: [address] [place] [address]      Patient: Farideh Navarro   : 1990  Diagnosis/ICD-10 Code:  Right low back pain, unspecified chronicity, unspecified whether sciatica present [M54.50]  Referring practitioner: JULIEN Tirado*  Date of Initial Visit: Type: THERAPY  Noted: 2024  Today's Date: 2024  Patient seen for 4 sessions      Subjective:   Subjective Questionnaire: Oswestry:   Clinical Progress: improved  Home Program Compliance: Yes  Treatment has included: balance/weight-bearing training, ADL retraining, soft tissue mobilization, strengthening, stretching, therapeutic activities, manual therapy, joint mobilization, home exercise program/patient education, gait training, functional ROM exercises, flexibility, body mechanics training, postural training, and neuromuscular re-education    Subjective   Pt reports the did have to drive some (they were on vacation), so now their pain is primarily in their low back. Pt reports they did walk more and was able to use their HEP to help their pain. Pt reports their current level of pain is a 3/10. Pt reports the numbness has been doing better. Pt attributes the increase in their pain w/ return to work, due to the prolonged sitting.       Objective   Active Range of Motion      Lumbar   Flexion: 30 degrees   Extension: 20 degrees     Left Hip   External rotation (90/90): 32 degrees   Internal rotation (90/90): 28 degrees      Right Hip   External rotation (90/90): 27 degrees   Internal rotation (90/90): 28 degrees      Additional Active Range of Motion Details  No pain w/ motion  L SB: 3 cm past jointline  R SB: 2 cm past jointline.      Strength/Myotome Testing      Left Hip   Planes of Motion   Flexion: 5  Extension: 5  Abduction: 4+     Right Hip   Planes of Motion   Flexion: 5  Extension: 4+  Abduction: 4+     Left Knee   Flexion: 5  Extension: 5     Right Knee   Flexion: 5  Extension:  5      See Exercise, Manual, and Modality Logs for complete treatment.     Assessment/Plan  Impairments: abnormal coordination, abnormal gait, abnormal muscle firing, abnormal or restricted ROM, activity intolerance, impaired physical strength, lacks appropriate home exercise program and pain with function   Functional limitations: carrying objects, lifting, walking, pulling, uncomfortable because of pain, sitting, standing, stooping and unable to perform repetitive tasks     Pt reported to physical therapy w/ complaints of R hip pain. Pt's pain is reproduced w/ low back testing today. pt demonstrates improvement in their ROM as well as their strength today. Pt overall has had decreased levels of pain and deficits described by NU. Pt continues to have some increaed pain w/ prolonged sitting, which is required of them for work. Continued to encouraged HEP. Patient will continue to benefit from skilled physical therapy to further address their deficits in strength and core stability in order to improve upon their functional mobility and to improve upon work tasks and ADLs w/o pain.       Goals  Plan Goals: LOW BACK PROBLEMS:     1. The patient complains of low back pain.  LTG 1: 12 weeks:  The patient will report a pain rating of 0/10 or better in order to improve  tolerance to activities of daily living and work tasks.  STATUS:  progressing  STG 1a: 6 weeks:  The patient will report a pain rating of 3/10 or better.  STATUS:  met  TREATMENT:  Therapeutic exercises, manual therapy, aquatic therapy, home exercise   instruction, and modalities as needed for pain to include:  electrical stimulation, moist heat, and ice.        2. The patient demonstrates weakness of the R hip.  LTG 2: 12 weeks:  The patient will demonstrate 5 /5 strength for R hip flexion, abduction,  and extension in order to improve hip stability.  STATUS:  progressing  STG 2a: 6 weeks:  The patient will demonstrate 4+ /5 strength for R hip flexion,  abduction,  and extension.  STATUS:  met  TREATMENT: Therapeutic exercises, manual therapy, aquatic therapy, home exercise instruction,  and modalities as needed for pain to include:  electrical stimulation, moist heat, and ice.        3. Mobility: Walking/Moving Around Functional Limitation                   LTG 3: 12 weeks:  The patient will demonstrate 11 % limitation by achieving a score of 5/45 on the NU.  STATUS:  progressing  STG 3a: 6 weeks:  The patient will demonstrate 22 % limitation by achieving a score of 10/45 on the NU.    STATUS:  met  TREATMENT:  Manual therapy, therapeutic exercise, home exercise instruction, and modalities as needed to include: moist heat, electrical stimulation, and ultrasound.       Progress toward previous goals: Partially Met      Recommendations: Continue as planned  Timeframe: 2 a week, for 2 months   Prognosis to achieve goals: good    PT Signature: Compa Rae PT, DPT    Electronically signed 2024    [place] License: PT - [id number]       Timed:  Manual Therapy:    0     mins  76187;  Therapeutic Exercise:    25     mins  34240;     Neuromuscular [place]:    0    mins  00623;    Therapeutic Activity:     15     mins  69076;     Gait Trainin     mins  27680;     Aquatics                         0      mins  70987    Un-timed:  Mechanical Traction      0     mins  82177  Dry Needling     0     mins self-pay  Electrical Stimulation:    0     mins  32147 ( );    Timed Treatment:   40   mins   Total Treatment:     40   mins

## 2024-06-17 ENCOUNTER — TREATMENT (OUTPATIENT)
Dept: PHYSICAL THERAPY | Facility: CLINIC | Age: 34
End: 2024-06-17
Payer: COMMERCIAL

## 2024-06-17 DIAGNOSIS — M62.81 MUSCLE WEAKNESS OF PROXIMAL EXTREMITY: ICD-10-CM

## 2024-06-17 DIAGNOSIS — R26.9 GAIT DISTURBANCE: ICD-10-CM

## 2024-06-17 DIAGNOSIS — M54.50 RIGHT LOW BACK PAIN, UNSPECIFIED CHRONICITY, UNSPECIFIED WHETHER SCIATICA PRESENT: Primary | ICD-10-CM

## 2024-06-17 DIAGNOSIS — M25.551 RIGHT HIP PAIN: ICD-10-CM

## 2024-06-17 NOTE — PROGRESS NOTES
Physical Therapy Daily Treatment Note  [facility] [address] [facility] [place] [address]      Patient: Farideh Navarro   : 1990  Diagnosis/ICD-10 Code:  Right low back pain, unspecified chronicity, unspecified whether sciatica present [M54.50]  Referring practitioner: JULIEN Tirado*  Date of Initial Visit: Type: THERAPY  Noted: 2024  Today's Date: 2024  Patient seen for 5 sessions           Subjective   The patient reported they are doing well. Pt reports their pain today is a 1/10.     Objective   See Exercise, Manual, and Modality Logs for complete treatment.     Assessment/Plan  Pt does have some complaints of anterior R hip pain w/ lunges today. Will continue to monitor this. Patient will continue to benefit from skilled physical therapy to further address their deficits in strength and stability in order to improve upon their functional mobility and to return to ADLs w/ decreased pain.          Timed:  Manual Therapy:    0     mins  12906;  Therapeutic Exercise:    8     mins  31494;     Neuromuscular [place]:   0    mins  21231;    Therapeutic Activity:     23     mins  36841;     Gait Trainin     mins  95509;     Aquatics                         0      mins  62569    Un-timed:  Mechanical Traction      0     mins  70044  Electrical Stimulation:    0     mins  63841 ( );      Timed Treatment:   31   mins   Total Treatment:     31   mins    Compa Rae PT, DPT    Electronically signed 2024    [place] License: PT - [id number]

## 2024-06-25 ENCOUNTER — TREATMENT (OUTPATIENT)
Dept: PHYSICAL THERAPY | Facility: CLINIC | Age: 34
End: 2024-06-25
Payer: COMMERCIAL

## 2024-06-25 DIAGNOSIS — M25.551 RIGHT HIP PAIN: ICD-10-CM

## 2024-06-25 DIAGNOSIS — M54.50 RIGHT LOW BACK PAIN, UNSPECIFIED CHRONICITY, UNSPECIFIED WHETHER SCIATICA PRESENT: Primary | ICD-10-CM

## 2024-06-25 DIAGNOSIS — R26.9 GAIT DISTURBANCE: ICD-10-CM

## 2024-06-25 DIAGNOSIS — M62.81 MUSCLE WEAKNESS OF PROXIMAL EXTREMITY: ICD-10-CM

## 2024-06-25 NOTE — PROGRESS NOTES
Outpatient Physical Therapy  [address] [facility] [place], [place] [address]                            Physical Therapy Daily Treatment Note    Patient: Farideh Navarro   : 1990  Diagnosis/ICD-10 Code:  Right low back pain, unspecified chronicity, unspecified whether sciatica present [M54.50]  Referring practitioner: JULIEN Tirado*  Date of Initial Visit: Type: THERAPY  Noted: 2024  Today's Date: 2024  Patient seen for 6 sessions           Subjective   Farideh Navarro reports: that the hip feels fine, but her back is a little sore today. States that she doesn't have pain down into her leg any more.     Objective   Discomfort in B hands when holding weight for squats.    See Exercise, Manual, and Modality Logs for complete treatment.     Assessment/Plan  Farideh progressing as evident by decreased overall low back  pain. Pt tolerated exercises well, no complaints of increased pain or discomfort. Pt would benefit from skilled PT to address Range of Motion  and Strength deficits, pain management and any concerns with ADLs.       Progress per Plan of Care         Timed:  Manual Therapy:         mins  47408;  Therapeutic Exercise:    10     mins  37202;     Neuromuscular Eliza:    8    mins  43757;    Therapeutic Activity:     12     mins  91613;     Gait Training:           mins  95030;        Untimed:  Electrical Stimulation:         mins  76337 ( );  Mechanical Traction:         mins  74899;       Timed Treatment:   30   mins   Total Treatment:     30   mins      Electronically signed:     Vicki Guerrero PTA  Physical Therapist Assistant  [facility] License #: [id number]

## 2024-06-28 ENCOUNTER — TREATMENT (OUTPATIENT)
Dept: PHYSICAL THERAPY | Facility: CLINIC | Age: 34
End: 2024-06-28
Payer: COMMERCIAL

## 2024-06-28 DIAGNOSIS — M25.551 RIGHT HIP PAIN: ICD-10-CM

## 2024-06-28 DIAGNOSIS — R26.9 GAIT DISTURBANCE: ICD-10-CM

## 2024-06-28 DIAGNOSIS — M62.81 MUSCLE WEAKNESS OF PROXIMAL EXTREMITY: ICD-10-CM

## 2024-06-28 DIAGNOSIS — M54.50 RIGHT LOW BACK PAIN, UNSPECIFIED CHRONICITY, UNSPECIFIED WHETHER SCIATICA PRESENT: Primary | ICD-10-CM

## 2024-06-28 NOTE — PROGRESS NOTES
Outpatient Physical Therapy  [address] [facility] [place][[address]], [place] [address]                            Physical Therapy Daily Treatment Note    Patient: Farideh Navarro   : 1990  Diagnosis/ICD-10 Code:  Right low back pain, unspecified chronicity, unspecified whether sciatica present [M54.50]  Referring practitioner: JULIEN Tirado*  Date of Initial Visit: Type: THERAPY  Noted: 2024  Today's Date: 2024  Patient seen for 7 sessions           Subjective   Farideh Navarro reports: overall the leg and the low back are doing better. States that she has 2 flights of stairs at her house, if she does the stairs consisently it will hurt. If she is able to do a flight of stairs and then rest it doesn't bother her as much.     Objective   Exercises required UE held today due to increased left hand pain    See Exercise, Manual, and Modality Logs for complete treatment.     Assessment/Plan  Farideh progressing as evident by decreased overall low back  and RLE pain. Pt tolerated exercises well, no complaints of increased pain or discomfort. Pt would benefit from skilled PT to address Range of Motion  and Strength deficits, pain management and any concerns with ADLs.       Progress per Plan of Care         Timed:  Manual Therapy:         mins  00520;  Therapeutic Exercise:    10     mins  15408;     Neuromuscular Eliza:    8    mins  35461;    Therapeutic Activity:     12     mins  09065;     Gait Training:           mins  63363;        Untimed:  Electrical Stimulation:         mins  06889 ( );  Mechanical Traction:         mins  85990;       Timed Treatment:   30   mins   Total Treatment:     30   mins      Electronically signed:     Vicki Guerrero PTA  Physical Therapist Assistant  [facility] License #: [id number]

## 2024-07-01 ENCOUNTER — TREATMENT (OUTPATIENT)
Dept: PHYSICAL THERAPY | Facility: CLINIC | Age: 34
End: 2024-07-01
Payer: COMMERCIAL

## 2024-07-01 DIAGNOSIS — M62.81 MUSCLE WEAKNESS OF PROXIMAL EXTREMITY: ICD-10-CM

## 2024-07-01 DIAGNOSIS — M25.551 RIGHT HIP PAIN: ICD-10-CM

## 2024-07-01 DIAGNOSIS — M54.50 RIGHT LOW BACK PAIN, UNSPECIFIED CHRONICITY, UNSPECIFIED WHETHER SCIATICA PRESENT: Primary | ICD-10-CM

## 2024-07-01 DIAGNOSIS — R26.9 GAIT DISTURBANCE: ICD-10-CM

## 2024-07-01 NOTE — PROGRESS NOTES
Physical Therapy Daily Treatment Note  [place] [facility] [address] [place] [address]    Patient: Farideh Navarro   : 1990  Referring practitioner: JULIEN Tirado*  Date of Initial Visit: Type: THERAPY  Noted: 2024  Today's Date: 2024  Patient seen for 8 sessions           Subjective  Farideh Navarro reports: she is doing better with her back c/o.      Objective   See Exercise, Manual, and Modality Logs for complete treatment.     Assessment/Plan  Farideh demonstrated good tolerance to exercises performed today. Ongoing intervention required to attain best possible outcome. PN due at visit 24.     Visit Diagnoses:    ICD-10-CM ICD-9-CM   1. Right low back pain, unspecified chronicity, unspecified whether sciatica present  M54.50 724.2   2. Right hip pain  M25.551 719.45   3. Gait disturbance  R26.9 781.2   4. Muscle weakness of proximal extremity  M62.81 728.87       Progress per Plan of Care and Progress strengthening /stabilization /functional activity           Timed:  Manual Therapy:         mins  72012;  Therapeutic Exercise:    9     mins  99849;     Neuromuscular Eliza:    12    mins  54965;    Therapeutic Activity:     9     mins  39471;     Gait Training:           mins  73818;     Ultrasound:          mins  40047;    Electrical Stimulation:         mins  91011 ( );  Aquatics  __   mins   64249    Untimed:  Electrical Stimulation:         mins  13062 ( );  Mechanical Traction:         mins  80103;     Timed Treatment:   30   mins   Total Treatment:     30   mins    Electronically Signed:  Stefany Ann PTA  Physical Therapist Assistant    [place] PTA license [id number]             Vaginal Delivery

## 2024-07-19 ENCOUNTER — TREATMENT (OUTPATIENT)
Dept: PHYSICAL THERAPY | Facility: CLINIC | Age: 34
End: 2024-07-19
Payer: COMMERCIAL

## 2024-07-19 DIAGNOSIS — M54.50 RIGHT LOW BACK PAIN, UNSPECIFIED CHRONICITY, UNSPECIFIED WHETHER SCIATICA PRESENT: Primary | ICD-10-CM

## 2024-07-19 DIAGNOSIS — R26.9 GAIT DISTURBANCE: ICD-10-CM

## 2024-07-19 DIAGNOSIS — M25.551 RIGHT HIP PAIN: ICD-10-CM

## 2024-07-19 DIAGNOSIS — M62.81 MUSCLE WEAKNESS OF PROXIMAL EXTREMITY: ICD-10-CM

## 2024-07-19 NOTE — PROGRESS NOTES
Progress Note   [facility]: [address] [place] [address]      Patient: Farideh Navarro   : 1990  Diagnosis/ICD-10 Code:  Right low back pain, unspecified chronicity, unspecified whether sciatica present [M54.50]  Referring practitioner: JULIEN Tirado*  Date of Initial Visit: Type: THERAPY  Noted: 2024  Today's Date: 2024  Patient seen for 9 sessions      Subjective:   Subjective Questionnaire: Oswestry:   Clinical Progress: unchanged  Home Program Compliance: Yes  Treatment has included: balance/weight-bearing training, ADL retraining, soft tissue mobilization, strengthening, stretching, therapeutic activities, manual therapy, joint mobilization, home exercise program/patient education, gait training, functional ROM exercises, flexibility, body mechanics training, postural training, and neuromuscular re-education    Subjective   Pt reports the pain has been doing well in their low back and their B hips. Pt reports they were sick so some stiffness has increased. Pt also reports traveling has still been limited.   Pt has been more cognizant of their position in sitting as well as to ensure they are more active during the day. Pt reports 0/10 pain today.   They have had some stabbing pain in the early afternoon, when they are sitting or busy w/ work.       Objective   Active Range of Motion      Lumbar   Flexion: 30 degrees   Extension: 20 degrees     Additional Active Range of Motion Details  No pain w/ motion  L SB: 3 cm past jointline  R SB: 1 cm past jointline.          Left Hip   External rotation (90/90): 35 degrees   Internal rotation (90/90): 30 degrees      Right Hip   External rotation (90/90): 29 degrees   Internal rotation (90/90): 31 degrees         Strength/Myotome Testing      Left Hip   Planes of Motion   Flexion: 5  Extension: 5  Abduction: 4+     Right Hip   Planes of Motion   Flexion: 5  Extension: 5  Abduction: 4+     Left Knee   Flexion:  5  Extension: 5     Right Knee   Flexion: 5  Extension: 5      See Exercise, Manual, and Modality Logs for complete treatment.     Assessment/Plan     Pt reported to physical therapy w/ complaints of R hip pain. Pt has no pain at this time. Pt demonstrates improvement in their strength as well as their ROM. Pt has no change in their deficits, and attributes this to prolonged travel. Discussed w/ moving to full time HEP and return to gym over the next two weeks, and pt agrees to this. Patient will continue to benefit from skilled physical therapy to further address their deficits in strength and to return them to a full time HEP for pain management.       Goals  Plan Goals: LOW BACK PROBLEMS:     1. The patient complains of low back pain.  LTG 1: 12 weeks:  The patient will report a pain rating of 0/10 or better in order to improve  tolerance to activities of daily living and work tasks.  STATUS:  met  STG 1a: 6 weeks:  The patient will report a pain rating of 3/10 or better.  STATUS:  met  TREATMENT:  Therapeutic exercises, manual therapy, aquatic therapy, home exercise   instruction, and modalities as needed for pain to include:  electrical stimulation, moist heat, and ice.        2. The patient demonstrates weakness of the R hip.  LTG 2: 12 weeks:  The patient will demonstrate 5 /5 strength for R hip flexion, abduction,  and extension in order to improve hip stability.  STATUS:  progressing  STG 2a: 6 weeks:  The patient will demonstrate 4+ /5 strength for R hip flexion, abduction,  and extension.  STATUS:  met  TREATMENT: Therapeutic exercises, manual therapy, aquatic therapy, home exercise instruction,  and modalities as needed for pain to include:  electrical stimulation, moist heat, and ice.        3. Mobility: Walking/Moving Around Functional Limitation                   LTG 3: 12 weeks:  The patient will demonstrate 11 % limitation by achieving a score of 5/45 on the NU.  STATUS:  progressing  STG 3a: 6  weeks:  The patient will demonstrate 22 % limitation by achieving a score of 10/45 on the NU.    STATUS:  met  TREATMENT:  Manual therapy, therapeutic exercise, home exercise instruction, and modalities as needed to include: moist heat, electrical stimulation, and ultrasound.      Progress toward previous goals: Partially Met      Recommendations: Continue as planned  Timeframe: 2 a week, for 1 months   Prognosis to achieve goals: good    PT Signature: Compa Rae PT, DPT    Electronically signed 2024    [place] License: PT - [id number]       Timed:  Manual Therapy:    0     mins  88759;  Therapeutic Exercise:    23     mins  07056;     Neuromuscular Eliza:    0    mins  05080;    Therapeutic Activity:     8     mins  65883;     Gait Trainin     mins  69091;     Aquatics                         0      mins  33522    Un-timed:  Mechanical Traction      0     mins  37118  Dry Needling     0     mins self-pay  Electrical Stimulation:    0     mins  35712 ( );    Timed Treatment:   31   mins   Total Treatment:     31   mins

## 2024-07-26 ENCOUNTER — TREATMENT (OUTPATIENT)
Dept: PHYSICAL THERAPY | Facility: CLINIC | Age: 34
End: 2024-07-26
Payer: COMMERCIAL

## 2024-07-26 DIAGNOSIS — M54.50 RIGHT LOW BACK PAIN, UNSPECIFIED CHRONICITY, UNSPECIFIED WHETHER SCIATICA PRESENT: Primary | ICD-10-CM

## 2024-07-26 DIAGNOSIS — R26.9 GAIT DISTURBANCE: ICD-10-CM

## 2024-07-26 DIAGNOSIS — M25.551 RIGHT HIP PAIN: ICD-10-CM

## 2024-07-26 DIAGNOSIS — M62.81 MUSCLE WEAKNESS OF PROXIMAL EXTREMITY: ICD-10-CM

## 2024-07-26 NOTE — PROGRESS NOTES
Outpatient Physical Therapy  [address] [facility], [place] [address]                            Physical Therapy Daily Treatment Note    Patient: Farideh Navarro   : 1990  Diagnosis/ICD-10 Code:  Right low back pain, unspecified chronicity, unspecified whether sciatica present [M54.50]  Referring practitioner: JULIEN Tirado*  Date of Initial Visit: Type: THERAPY  Noted: 2024  Today's Date: 2024  Patient seen for 10 sessions           Subjective   Farideh Navarro reports: that her back and her hip are much better. States that she didn't have any pain at last PT session. Reports that she mainly sits at work, but tries to get up and walk around, do some squats.     Objective   No complaints of increased pain or discomfort.    See Exercise, Manual, and Modality Logs for complete treatment.     Assessment/Plan  Farideh progressing as evident by decreased overall low back  pain. Pt tolerated exercises well, no complaints of increased pain or discomfort. Pt would benefit from skilled PT to address Range of Motion  and Strength deficits, pain management and any concerns with ADLs.       Progress per Plan of Care         Timed:  Manual Therapy:         mins  48276;  Therapeutic Exercise:    10     mins  00233;     Neuromuscular Eliza:    8    mins  40814;    Therapeutic Activity:     12     mins  22024;     Gait Training:           mins  22492;      Untimed:  Electrical Stimulation:         mins  52106 ( );  Mechanical Traction:         mins  82768;     Timed Treatment:   30   mins   Total Treatment:     30   mins      Electronically signed:     Vicki Guerrero PTA  Physical Therapist Assistant  [facility] License #: [id number]

## 2024-08-01 ENCOUNTER — TREATMENT (OUTPATIENT)
Dept: PHYSICAL THERAPY | Facility: CLINIC | Age: 34
End: 2024-08-01
Payer: COMMERCIAL

## 2024-08-01 DIAGNOSIS — M54.50 RIGHT LOW BACK PAIN, UNSPECIFIED CHRONICITY, UNSPECIFIED WHETHER SCIATICA PRESENT: Primary | ICD-10-CM

## 2024-08-01 DIAGNOSIS — M25.551 RIGHT HIP PAIN: ICD-10-CM

## 2024-08-01 DIAGNOSIS — M62.81 MUSCLE WEAKNESS OF PROXIMAL EXTREMITY: ICD-10-CM

## 2024-08-01 DIAGNOSIS — R26.9 GAIT DISTURBANCE: ICD-10-CM

## 2024-08-01 NOTE — PROGRESS NOTES
Outpatient Physical Therapy  [address] [facility] [place], [place] [address]                            Physical Therapy Daily Treatment Note    Patient: Farideh Navarro   : 1990  Diagnosis/ICD-10 Code:  Right low back pain, unspecified chronicity, unspecified whether sciatica present [M54.50]  Referring practitioner: JULIEN Tirado*  Date of Initial Visit: Type: THERAPY  Noted: 2024  Today's Date: 2024  Patient seen for 11 sessions           Subjective   Farideh Navarro reports: overall the back is feeling better. She did a catch in her left hip, but thinks it was the way her kid was sitting in her lap. States that she is doing the majority of her PT exercises at home, plans to focus on strengthening exercises.     Objective   No complaints of increased pain or discomfort.    See Exercise, Manual, and Modality Logs for complete treatment.     Assessment/Plan  Farideh progressing as evident by decreased overall low back  pain. Pt tolerated exercises well, no complaints of increased pain or discomfort. Pt planning on discharging at this time, to continuing strengthening at home.       Progress per Plan of Care         Timed:  Manual Therapy:         mins  16018;  Therapeutic Exercise:    10     mins  13627;     Neuromuscular Eliza:    8    mins  82182;    Therapeutic Activity:     12     mins  67967;     Gait Training:           mins  21900;      Untimed:  Electrical Stimulation:         mins  95368 ( );  Mechanical Traction:         mins  47274;     Timed Treatment:   30   mins   Total Treatment:     30   mins      Electronically signed:     Vicki Guerrero PTA  Physical Therapist Assistant  [facility] License #: [id number]

## 2024-09-12 ENCOUNTER — DOCUMENTATION (OUTPATIENT)
Dept: PHYSICAL THERAPY | Facility: CLINIC | Age: 34
End: 2024-09-12
Payer: COMMERCIAL

## 2024-09-12 NOTE — PROGRESS NOTES
Outpatient Physical Therapy  [address] [facility] [place], [place] [address]      Discharge Summary  Discharge Summary from Physical Therapy Report      Dates  PT visit: 5/16/24-8/1/24  Number of Visits: 11       Goals  Plan Goals: LOW BACK PROBLEMS:     1. The patient complains of low back pain.  LTG 1: 12 weeks:  The patient will report a pain rating of 0/10 or better in order to improve  tolerance to activities of daily living and work tasks.  STATUS:  met  STG 1a: 6 weeks:  The patient will report a pain rating of 3/10 or better.  STATUS:  met  TREATMENT:  Therapeutic exercises, manual therapy, aquatic therapy, home exercise   instruction, and modalities as needed for pain to include:  electrical stimulation, moist heat, and ice.        2. The patient demonstrates weakness of the R hip.  LTG 2: 12 weeks:  The patient will demonstrate 5 /5 strength for R hip flexion, abduction,  and extension in order to improve hip stability.  STATUS:  progressing  STG 2a: 6 weeks:  The patient will demonstrate 4+ /5 strength for R hip flexion, abduction,  and extension.  STATUS:  met  TREATMENT: Therapeutic exercises, manual therapy, aquatic therapy, home exercise instruction,  and modalities as needed for pain to include:  electrical stimulation, moist heat, and ice.        3. Mobility: Walking/Moving Around Functional Limitation                   LTG 3: 12 weeks:  The patient will demonstrate 11 % limitation by achieving a score of 5/45 on the NU.  STATUS:  progressing  STG 3a: 6 weeks:  The patient will demonstrate 22 % limitation by achieving a score of 10/45 on the NU.    STATUS:  met  TREATMENT:  Manual therapy, therapeutic exercise, home exercise instruction, and modalities as needed to include: moist heat, electrical stimulation, and ultrasound.       Discharge Plan: Continue with current home exercise program as instructed    Date of Discharge 9/12/2024      Electronically signed:   Vicki Guerrero PTA  Physical  Therapist Assistant  [facility] License #: [id number]

## 2024-12-27 NOTE — PROGRESS NOTES
"Chief Complaint  Annual Exam    Subjective          Farideh Navarro presents to [facility]  History of Present Illness    History of Present Illness      Carpal tunnel- left hand.  Having issues.    Patient presents today for annual exam.  Denies any chest pain, shortness of breath, abdominal pain, nausea, vomiting, diarrhea.       Current Outpatient Medications   Medication Instructions    Collagen-Vitamin C-Biotin (COLLAGEN PO) 1 each, Daily       The following portions of the patient's history were reviewed and updated as appropriate: allergies, current medications, past family history, past medical history, past social history, past surgical history, and problem list.    Objective   Vital Signs:   /69 (BP Location: Left arm, Patient Position: Sitting, Cuff Size: Large Adult)   Pulse 89   Temp 98.2 °F (36.8 °C) (Temporal)   Ht 162.6 cm (64\")   Wt 86.2 kg (190 lb)   SpO2 97%   BMI 32.61 kg/m²     BP Readings from Last 3 Encounters:   01/02/25 100/69   09/15/24 112/80   07/07/24 109/78     Wt Readings from Last 3 Encounters:   01/02/25 86.2 kg (190 lb)   09/15/24 87.1 kg (192 lb)   07/07/24 85.7 kg (189 lb)           Physical Exam     Appearance: No acute distress, well-nourished  Head: normocephalic, atraumatic  Eyes: extraocular movements intact, no scleral icterus, no conjunctival injection  Ears, Nose, and Throat: external ears normal, nares patent, moist mucous membranes  Cardiovascular: regular rate and rhythm. no murmurs, rubs, or gallops. no edema  Respiratory: breathing comfortably, symmetric chest rise, clear to auscultation bilaterally. No wheezes, rales, or rhonchi.  Neuro: alert and oriented to time, place, and person. Normal gait  Psych: normal mood and affect     Physical Exam        Result Review :   The following data was reviewed by: CECILIO Tirado on 01/02/2025:  Common labs          1/2/2025    15:06   Common Labs "   Glucose 88    BUN 7    Creatinine 0.87    Sodium 136    Potassium 4.5    Chloride 104    Calcium 9.2    Albumin 4.1    Total Bilirubin 0.3    Alkaline Phosphatase 61    AST (SGOT) 20    ALT (SGPT) 15    WBC 4.00    Hemoglobin 13.5    Hematocrit 40.5    Platelets 265    Total Cholesterol 149    Triglycerides 144    HDL Cholesterol 25    LDL Cholesterol  98        Results           Lab Results   Component Value Date    SARSANTIGEN Not Detected 09/15/2024    COVID19 Not Detected 08/19/2022    RAPFLUA Negative 08/03/2023    RAPFLUB Negative 08/03/2023    FLUAAG Not Detected 09/15/2024    FLUBAG Not Detected 09/15/2024    RAPSCRN Negative 09/15/2024    POCPREGUR Negative 08/03/2023    BILIRUBINUR Negative 08/03/2023            Assessment and Plan    Diagnoses and all orders for this visit:    1. Annual physical exam (Primary)  -     TSH Rfx On Abnormal To Free T4  -     Lipid Panel  -     Comprehensive Metabolic Panel  -     CBC & Differential    2. Screening for thyroid disorder  -     TSH Rfx On Abnormal To Free T4    3. Screening for lipid disorders  -     Lipid Panel    4. Bilateral carpal tunnel syndrome  -     EMG & Nerve Conduction Test; Future    5. Arthralgia, unspecified joint  -     EMG & Nerve Conduction Test; Future    6. Irritable bowel syndrome with diarrhea  Overview:  Added automatically from request for surgery [id number]      7. Class 1 obesity without serious comorbidity with body mass index (BMI) of 32.0 to 32.9 in adult, unspecified obesity type  Assessment & Plan:  Patient's (Body mass index is 32.61 kg/m².) indicates that they are obese (BMI >30) with health conditions that include none . Weight is unchanged. BMI  is above average; BMI management plan is completed. We discussed low calorie, low carb based diet program, portion control, and increasing exercise.           Assessment & Plan      There are no discontinued medications.       Follow Up   Return in about 1 year (around 1/2/2026) for  Annual physical.  Patient was given instructions and counseling regarding her condition or for health maintenance advice. Please see specific information pulled into the AVS if appropriate.       CECILIO Tirado  01/07/25  12:53 EST      Patient or patient representative verbalized consent for the use of Ambient Listening during the visit with  CECILIO Tirado for chart documentation. 1/7/2025  12:53 EST

## 2025-01-02 ENCOUNTER — OFFICE VISIT (OUTPATIENT)
Dept: INTERNAL MEDICINE | Facility: CLINIC | Age: 35
End: 2025-01-02
Payer: COMMERCIAL

## 2025-01-02 VITALS
HEART RATE: 89 BPM | WEIGHT: 190 LBS | TEMPERATURE: 98.2 F | BODY MASS INDEX: 32.44 KG/M2 | OXYGEN SATURATION: 97 % | DIASTOLIC BLOOD PRESSURE: 69 MMHG | SYSTOLIC BLOOD PRESSURE: 100 MMHG | HEIGHT: 64 IN

## 2025-01-02 DIAGNOSIS — E66.811 CLASS 1 OBESITY WITHOUT SERIOUS COMORBIDITY WITH BODY MASS INDEX (BMI) OF 32.0 TO 32.9 IN ADULT, UNSPECIFIED OBESITY TYPE: ICD-10-CM

## 2025-01-02 DIAGNOSIS — K58.0 IRRITABLE BOWEL SYNDROME WITH DIARRHEA: ICD-10-CM

## 2025-01-02 DIAGNOSIS — Z13.220 SCREENING FOR LIPID DISORDERS: ICD-10-CM

## 2025-01-02 DIAGNOSIS — Z00.00 ANNUAL PHYSICAL EXAM: Primary | ICD-10-CM

## 2025-01-02 DIAGNOSIS — G56.03 BILATERAL CARPAL TUNNEL SYNDROME: ICD-10-CM

## 2025-01-02 DIAGNOSIS — Z13.29 SCREENING FOR THYROID DISORDER: ICD-10-CM

## 2025-01-02 DIAGNOSIS — M25.50 ARTHRALGIA, UNSPECIFIED JOINT: ICD-10-CM

## 2025-01-02 LAB
ALBUMIN SERPL-MCNC: 4.1 G/DL (ref 3.5–5.2)
ALBUMIN/GLOB SERPL: 1.4 G/DL
ALP SERPL-CCNC: 61 U/L (ref 39–117)
ALT SERPL W P-5'-P-CCNC: 15 U/L (ref 1–33)
ANION GAP SERPL CALCULATED.3IONS-SCNC: 7.5 MMOL/L (ref 5–15)
AST SERPL-CCNC: 20 U/L (ref 1–32)
BASOPHILS # BLD AUTO: 0.03 10*3/MM3 (ref 0–0.2)
BASOPHILS NFR BLD AUTO: 0.8 % (ref 0–1.5)
BILIRUB SERPL-MCNC: 0.3 MG/DL (ref 0–1.2)
BUN SERPL-MCNC: 7 MG/DL (ref 6–20)
BUN/CREAT SERPL: 8 (ref 7–25)
CALCIUM SPEC-SCNC: 9.2 MG/DL (ref 8.6–10.5)
CHLORIDE SERPL-SCNC: 104 MMOL/L (ref 98–107)
CHOLEST SERPL-MCNC: 149 MG/DL (ref 0–200)
CO2 SERPL-SCNC: 24.5 MMOL/L (ref 22–29)
CREAT SERPL-MCNC: 0.87 MG/DL (ref 0.57–1)
DEPRECATED RDW RBC AUTO: 39.5 FL (ref 37–54)
EGFRCR SERPLBLD CKD-EPI 2021: 89.8 ML/MIN/1.73
EOSINOPHIL # BLD AUTO: 0.1 10*3/MM3 (ref 0–0.4)
EOSINOPHIL NFR BLD AUTO: 2.5 % (ref 0.3–6.2)
ERYTHROCYTE [DISTWIDTH] IN BLOOD BY AUTOMATED COUNT: 11.8 % (ref 12.3–15.4)
GLOBULIN UR ELPH-MCNC: 3 GM/DL
GLUCOSE SERPL-MCNC: 88 MG/DL (ref 65–99)
HCT VFR BLD AUTO: 40.5 % (ref 34–46.6)
HDLC SERPL-MCNC: 25 MG/DL (ref 40–60)
HGB BLD-MCNC: 13.5 G/DL (ref 12–15.9)
IMM GRANULOCYTES # BLD AUTO: 0 10*3/MM3 (ref 0–0.05)
IMM GRANULOCYTES NFR BLD AUTO: 0 % (ref 0–0.5)
LDLC SERPL CALC-MCNC: 98 MG/DL (ref 0–100)
LDLC/HDLC SERPL: 3.81 {RATIO}
LYMPHOCYTES # BLD AUTO: 1.54 10*3/MM3 (ref 0.7–3.1)
LYMPHOCYTES NFR BLD AUTO: 38.5 % (ref 19.6–45.3)
MCH RBC QN AUTO: 30.4 PG (ref 26.6–33)
MCHC RBC AUTO-ENTMCNC: 33.3 G/DL (ref 31.5–35.7)
MCV RBC AUTO: 91.2 FL (ref 79–97)
MONOCYTES # BLD AUTO: 0.26 10*3/MM3 (ref 0.1–0.9)
MONOCYTES NFR BLD AUTO: 6.5 % (ref 5–12)
NEUTROPHILS NFR BLD AUTO: 2.07 10*3/MM3 (ref 1.7–7)
NEUTROPHILS NFR BLD AUTO: 51.7 % (ref 42.7–76)
NRBC BLD AUTO-RTO: 0 /100 WBC (ref 0–0.2)
PLATELET # BLD AUTO: 265 10*3/MM3 (ref 140–450)
PMV BLD AUTO: 12.1 FL (ref 6–12)
POTASSIUM SERPL-SCNC: 4.5 MMOL/L (ref 3.5–5.2)
PROT SERPL-MCNC: 7.1 G/DL (ref 6–8.5)
RBC # BLD AUTO: 4.44 10*6/MM3 (ref 3.77–5.28)
SODIUM SERPL-SCNC: 136 MMOL/L (ref 136–145)
TRIGL SERPL-MCNC: 144 MG/DL (ref 0–150)
TSH SERPL DL<=0.05 MIU/L-ACNC: 2.8 UIU/ML (ref 0.27–4.2)
VLDLC SERPL-MCNC: 26 MG/DL (ref 5–40)
WBC NRBC COR # BLD AUTO: 4 10*3/MM3 (ref 3.4–10.8)

## 2025-01-02 PROCEDURE — 99213 OFFICE O/P EST LOW 20 MIN: CPT | Performed by: NURSE PRACTITIONER

## 2025-01-02 PROCEDURE — 80061 LIPID PANEL: CPT | Performed by: NURSE PRACTITIONER

## 2025-01-02 PROCEDURE — 99395 PREV VISIT EST AGE 18-39: CPT | Performed by: NURSE PRACTITIONER

## 2025-01-02 PROCEDURE — 80050 GENERAL HEALTH PANEL: CPT | Performed by: NURSE PRACTITIONER

## 2025-01-07 NOTE — ASSESSMENT & PLAN NOTE
Patient's (Body mass index is 32.61 kg/m².) indicates that they are obese (BMI >30) with health conditions that include none . Weight is unchanged. BMI  is above average; BMI management plan is completed. We discussed low calorie, low carb based diet program, portion control, and increasing exercise.

## 2025-01-23 ENCOUNTER — HOSPITAL ENCOUNTER (OUTPATIENT)
Facility: HOSPITAL | Age: 35
Discharge: HOME | End: 2025-01-23
Admitting: NURSE PRACTITIONER
Payer: COMMERCIAL

## 2025-01-23 DIAGNOSIS — M25.50 ARTHRALGIA, UNSPECIFIED JOINT: ICD-10-CM

## 2025-01-23 DIAGNOSIS — G56.03 BILATERAL CARPAL TUNNEL SYNDROME: ICD-10-CM

## 2025-01-23 PROCEDURE — 95886 MUSC TEST DONE W/N TEST COMP: CPT

## 2025-01-23 PROCEDURE — 95911 NRV CNDJ TEST 9-10 STUDIES: CPT

## 2025-01-24 ENCOUNTER — TELEPHONE (OUTPATIENT)
Dept: INTERNAL MEDICINE | Facility: CLINIC | Age: 35
End: 2025-01-24
Payer: COMMERCIAL

## 2025-01-24 NOTE — TELEPHONE ENCOUNTER
Name: Farideh Navarro    Relationship: Self    Best Callback Number: [phone]     HUB PROVIDED THE RELAY MESSAGE FROM THE OFFICE   PATIENT VOICED UNDERSTANDING AND HAS NO FURTHER QUESTIONS AT THIS TIME

## 2025-01-24 NOTE — TELEPHONE ENCOUNTER
----- Message from Vianca Mercado sent at 1/24/2025  1:28 PM EST -----  Normal EMG  ----- Message -----  From: Sher Haider PT  Sent: 1/23/2025   4:08 PM EST  To: CECILIO Tirado

## 2025-01-24 NOTE — TELEPHONE ENCOUNTER
"Attempted to contact patient. Left message to call the office back.      Relay   HUB ok to read/advise  \"Normal EMG \"          "

## 2025-07-12 PROCEDURE — 87086 URINE CULTURE/COLONY COUNT: CPT | Performed by: NURSE PRACTITIONER

## (undated) DEVICE — Device: Brand: DEFENDO AIR/WATER/SUCTION AND BIOPSY VALVE

## (undated) DEVICE — THE SINGLE USE ETRAP – POLYP TRAP IS USED FOR SUCTION RETRIEVAL OF ENDOSCOPICALLY REMOVED POLYPS.: Brand: ETRAP

## (undated) DEVICE — SOL IRRG H2O PL/BG 1000ML STRL

## (undated) DEVICE — CONN JET HYDRA H20 AUXILIARY DISP

## (undated) DEVICE — SOLIDIFIER LIQLOC PLS 1500CC BT

## (undated) DEVICE — Device

## (undated) DEVICE — SINGLE-USE BIOPSY FORCEPS: Brand: RADIAL JAW 4

## (undated) DEVICE — LINER SURG CANSTR SXN S/RIGD 1500CC

## (undated) DEVICE — SNAR POLYP CAPTIFLEX XS/OVL 11X2.4MM 240CM 1P/U